# Patient Record
Sex: FEMALE | Employment: OTHER | URBAN - METROPOLITAN AREA
[De-identification: names, ages, dates, MRNs, and addresses within clinical notes are randomized per-mention and may not be internally consistent; named-entity substitution may affect disease eponyms.]

---

## 2019-07-11 ENCOUNTER — TRANSFERRED RECORDS (OUTPATIENT)
Dept: HEALTH INFORMATION MANAGEMENT | Facility: CLINIC | Age: 59
End: 2019-07-11

## 2019-07-11 ENCOUNTER — MEDICAL CORRESPONDENCE (OUTPATIENT)
Dept: HEALTH INFORMATION MANAGEMENT | Facility: CLINIC | Age: 59
End: 2019-07-11

## 2019-09-09 ENCOUNTER — OFFICE VISIT (OUTPATIENT)
Dept: OPHTHALMOLOGY | Facility: CLINIC | Age: 59
End: 2019-09-09
Attending: OPHTHALMOLOGY
Payer: COMMERCIAL

## 2019-09-09 DIAGNOSIS — H40.052 OCULAR HYPERTENSION, LEFT EYE: ICD-10-CM

## 2019-09-09 DIAGNOSIS — H15.103 EPISCLERITIS/SCLERITIS, BILATERAL: Primary | ICD-10-CM

## 2019-09-09 DIAGNOSIS — H04.123 DRY EYES, BILATERAL: ICD-10-CM

## 2019-09-09 DIAGNOSIS — R76.8 ELEVATED ANTINUCLEAR ANTIBODY (ANA) LEVEL: ICD-10-CM

## 2019-09-09 DIAGNOSIS — H15.003 EPISCLERITIS/SCLERITIS, BILATERAL: Primary | ICD-10-CM

## 2019-09-09 PROBLEM — D75.1 SECONDARY POLYCYTHEMIA: Status: ACTIVE | Noted: 2019-09-09

## 2019-09-09 PROBLEM — M85.80 OSTEOPENIA: Status: ACTIVE | Noted: 2019-09-09

## 2019-09-09 PROBLEM — Z86.0100 HISTORY OF COLONIC POLYPS: Status: ACTIVE | Noted: 2019-09-09

## 2019-09-09 PROBLEM — L21.9 SEBORRHEIC DERMATITIS OF SCALP: Status: ACTIVE | Noted: 2019-09-09

## 2019-09-09 PROBLEM — M71.20 SYNOVIAL CYST OF POPLITEAL SPACE: Status: ACTIVE | Noted: 2019-09-09

## 2019-09-09 PROCEDURE — G0463 HOSPITAL OUTPT CLINIC VISIT: HCPCS | Mod: ZF

## 2019-09-09 PROCEDURE — 92134 CPTRZ OPH DX IMG PST SGM RTA: CPT | Mod: ZF | Performed by: OPHTHALMOLOGY

## 2019-09-09 RX ORDER — BUPROPION HYDROCHLORIDE 150 MG/1
150 TABLET ORAL
COMMUNITY
Start: 2018-09-27 | End: 2019-10-14 | Stop reason: DRUGHIGH

## 2019-09-09 RX ORDER — LOTEPREDNOL ETABONATE 5 MG/G
GEL OPHTHALMIC
COMMUNITY
Start: 2019-03-07 | End: 2019-10-14

## 2019-09-09 RX ORDER — PREDNISONE 20 MG/1
TABLET ORAL
Refills: 0 | COMMUNITY
Start: 2019-08-19 | End: 2019-10-14

## 2019-09-09 RX ORDER — CELECOXIB 200 MG/1
CAPSULE ORAL
Refills: 0 | COMMUNITY
Start: 2019-03-26 | End: 2019-10-14 | Stop reason: DRUGHIGH

## 2019-09-09 RX ORDER — TRIAMCINOLONE ACETONIDE 1 MG/G
CREAM TOPICAL
COMMUNITY
Start: 2018-07-16

## 2019-09-09 RX ORDER — CELECOXIB 100 MG/1
CAPSULE ORAL
Refills: 11 | COMMUNITY
Start: 2019-07-20 | End: 2019-10-14 | Stop reason: DRUGHIGH

## 2019-09-09 RX ORDER — TRIAMTERENE AND HYDROCHLOROTHIAZIDE 75; 50 MG/1; MG/1
1 TABLET ORAL
COMMUNITY
Start: 2019-01-07

## 2019-09-09 RX ORDER — PREDNISOLONE ACETATE 10 MG/ML
SUSPENSION/ DROPS OPHTHALMIC
Refills: 2 | COMMUNITY
Start: 2019-07-23 | End: 2019-10-14

## 2019-09-09 RX ORDER — KETOROLAC TROMETHAMINE 5 MG/ML
1 SOLUTION OPHTHALMIC 3 TIMES DAILY
Qty: 5 ML | Refills: 3 | Status: SHIPPED | OUTPATIENT
Start: 2019-09-09 | End: 2019-10-14

## 2019-09-09 RX ORDER — CLOBETASOL PROPIONATE 0.5 MG/G
OINTMENT TOPICAL
COMMUNITY
Start: 2018-09-08

## 2019-09-09 RX ORDER — LUBIPROSTONE 24 UG/1
CAPSULE, GELATIN COATED ORAL
Refills: 0 | COMMUNITY
Start: 2019-04-11

## 2019-09-09 RX ORDER — BUPROPION HYDROCHLORIDE 150 MG/1
300 TABLET ORAL DAILY
Refills: 1 | COMMUNITY
Start: 2019-08-07

## 2019-09-09 RX ORDER — CELECOXIB 200 MG/1
1 CAPSULE ORAL
COMMUNITY
Start: 2019-01-07 | End: 2024-03-13

## 2019-09-09 RX ORDER — CLINDAMYCIN HCL 150 MG
CAPSULE ORAL
Refills: 2 | COMMUNITY
Start: 2019-06-07

## 2019-09-09 RX ORDER — METOPROLOL SUCCINATE 25 MG/1
25 TABLET, EXTENDED RELEASE ORAL
COMMUNITY
End: 2019-10-14 | Stop reason: DRUGHIGH

## 2019-09-09 RX ORDER — FLUTICASONE PROPIONATE 50 MCG
2 SPRAY, SUSPENSION (ML) NASAL
COMMUNITY
Start: 2019-02-09

## 2019-09-09 RX ORDER — ATORVASTATIN CALCIUM 20 MG/1
20 TABLET, FILM COATED ORAL DAILY
Refills: 3 | COMMUNITY
Start: 2019-08-13 | End: 2024-03-13

## 2019-09-09 ASSESSMENT — CONF VISUAL FIELD
METHOD: COUNTING FINGERS
OD_NORMAL: 1
OS_NORMAL: 1

## 2019-09-09 ASSESSMENT — VISUAL ACUITY
OD_SC+: -2
OD_SC: 20/25
METHOD: SNELLEN - LINEAR
OS_SC: 20/20

## 2019-09-09 ASSESSMENT — TONOMETRY
IOP_METHOD: TONOPEN
OS_IOP_MMHG: 22
OD_IOP_MMHG: 18

## 2019-09-09 ASSESSMENT — CUP TO DISC RATIO
OS_RATIO: 0.3
OD_RATIO: 0.2

## 2019-09-09 ASSESSMENT — EXTERNAL EXAM - LEFT EYE: OS_EXAM: NORMAL

## 2019-09-09 ASSESSMENT — EXTERNAL EXAM - RIGHT EYE: OD_EXAM: NORMAL

## 2019-09-09 NOTE — PROGRESS NOTES
CC: Scleritis/Episcleritis evaluation    History of Present Illness  The initial symptoms pf eye pain and redness started this May 2019. This was first identified by Ms. Rosales's Optometrist who identified redness and pain of both eyes, with the right eye more affected. This episcleritis/scleritis was treated with topical steroid and was then referred to Dr. Shepherd.    While the symptoms were somewhat improved with topical steroid, the Ophthalmologist, Dr. Shepherd who suggested that there may be some additional medication necessary. Recommend increasing Celebrex to 200 mg BID (had been using 100 mg daily since January 2019). This dose has been used for a few months and does not seem to have made a noticeable difference in eye or knee pain.    Past Ocular History  On prior visit with her Optometrist, Dr. Salvador, Ms. Rosales was referred to a Rheumatologist to screen for inflammatory causes of scleritis, did not identify specific causes of systemic inflammation. PATITO positive identified in chart.    Ocular history also significant for dry eyes, which is somewhat relieved by Topical Xiidra for years. Artificial tears also used, seemed to have helped.     Recently evaluated by Dr. Kayden Larson at [facility]. Tried oral prednisone which helped pain and redness for few days, but once this was tapered, the achy pain started up again mostly in the left eye. There is no significant visual issue when the eyes are red. Overall since May 2019, there has always been some redness and the aching even if things have improved to some degree.     Relevant Past Medical/Family/Social History  Prior ulcer secondary to Ibuprofen use. Works as . Smoking since June 2019 with family stressors.     Labs (June 2019) reviewed in Chart:   Abnormal: PATITO+ 1:160, CBC with slight elevated Hemoglobin 15.8 & Hematocrit 45.0  Normal/negative: ANCA, Anti CCP, Creatinine, ESR, CRP    Per patient, negative TB and Syphilis  testing per Dr. Larson, but not in chart (done at [facility], called as normal per patient)    Review of systems significant for knee pain. Prior Rheumatology consultation and upcoming Orthopedic evaluation soon     Current eye medications: Celebrex 100 mg BID (also used for Osteoarthritis, Xiiidra BID each eye, Systane QID each eye, Pred Acetate daily OU    OCT Spectralis Macula 9/9/19  right eye: Normal foveal contour, normal choroidal thickness. Focal area nasally of outer segment disruption in peripapillary area, no fluid.  left eye: Normal foveal contour, normal choroidal thickness. No fluid    Assessment:    1. Episcleritis/scleritis, bilateral  Notes detail either type of inflammation. Blanching with topical dilating drops suggestive of Episcleritis. Systemic work up unrevealing other than PATITO+. Responded to topical Pred Forte, Celebrex and Prednisone.    2. Dry eyes, bilateral  On Xiidra and Systane. Likely responsible for some of the redness, irritation/discomfort. Ocular surfaces fairly well lubricated. This appears to be related to age associated Meibomian Gland Dysfunction causing tear film evaporation and not inflammatory ocular surface disease.     3. Ocular hypertension, left eye  Mild elevation, no prior mention or IOP drop use, healthy optic nerves    4. Elevated antinuclear antibody (PATITO) level  Documented with Rheumatology, remainder of evaluation negative for specific causes of inflammation.    Recommenations:  - Continue Xiidra BID each eye and Systane QID each eye as to keep up with lubrication of the ocular surface  - Counseled on smoking cessation as will likely help reduce redness and irritation. Pt to make not of redness of eyes in association to timing of smoking.   - Recommend reducing Celebrex to 100 mg daily (one pill) as this increased dose may not be helping eyes or knees  - Stop daily Pred Acetate and switch to topical Ketorolac three times daily each eye.   - No additional  lab testing/body imaging at this time for other causes of uveitis    RTC 1 month (latest PM Visit), IOP, no dilation, no testing planned      Physician Attestation     Attending Physician Attestation:  Complete documentation of historical and exam elements from today's encounter can be found in the full encounter summary report (not reduplicated in this progress note). I personally obtained the chief complaint(s) and history of present illness. I confirmed and edited as necessary the review of systems, past medical/surgical history, family history, social history, and examination findings as documented by others; and I examined the patient myself. I personally reviewed the relevant tests, images, and reports as documented above. I formulated and edited as necessary the assessment and plan and discussed the findings and management plan with the patient and family.    Juan F Saleh M.D. September 9, 2019

## 2019-09-09 NOTE — PATIENT INSTRUCTIONS
Here is the plan for the medications:    - Continue Xiidra twice daily and Systane four times daily to each eye as to keep up with lubrication  - Work on smoking reduction as will likely help reduce redness and irritation.   - Reduce Celebrex to 100 mg daily (one pill) daily  - Stop daily Pred Acetate (steroid eye drop)  - Start topical Ketorolac three times daily in each eye. This was sent to [facility] in [facility] in [place]  - No additional lab testing/body imaging at this time but we will see you back in 1 month

## 2019-09-09 NOTE — NURSING NOTE
Chief Complaints and History of Present Illnesses   Patient presents with     Uveitis Evaluation     Chief Complaint(s) and History of Present Illness(es)     Uveitis Evaluation     Laterality: both eyes    Onset: gradual    Onset: months ago    Quality: fluctuating (States that the va fluctuates with dry eyes or pain in the LE)    Frequency: constantly    Associated symptoms: eye pain (LE more that can come and go), dryness and floaters (not new)    Treatments tried: eye drops    Pain scale: 0/10              Comments     2 weeks ago started a oral steroid, which helped the pain but is now back  Feels that right now she is in a flare up of the dry eyes and the Episcleritis  Ambika Andrade COT 8:59 AM September 9, 2019

## 2019-09-09 NOTE — LETTER
9/9/2019      RE: Alisa Rosales  [address] [place] [address]       CC: Scleritis/Episcleritis evaluation    History of Present Illness  The initial symptoms pf eye pain and redness started this May 2019. This was first identified by Ms. Rosales's Optometrist who identified redness and pain of both eyes, with the right eye more affected. This episcleritis/scleritis was treated with topical steroid and was then referred to Dr. Shepherd.    While the symptoms were somewhat improved with topical steroid, the Ophthalmologist, Dr. Shepherd who suggested that there may be some additional medication necessary. Recommend increasing Celebrex to 200 mg BID (had been using 100 mg daily since January 2019). This dose has been used for a few months and does not seem to have made a noticeable difference in eye or knee pain.    Past Ocular History  On prior visit with her Optometrist, Dr. Salvador, Ms. Rosales was referred to a Rheumatologist to screen for inflammatory causes of scleritis, did not identify specific causes of systemic inflammation. PATITO positive identified in chart.    Ocular history also significant for dry eyes, which is somewhat relieved by Topical Xiidra for years. Artificial tears also used, seemed to have helped.     Recently evaluated by Dr. Kayden Larson at [facility]. Tried oral prednisone which helped pain and redness for few days, but once this was tapered, the achy pain started up again mostly in the left eye. There is no significant visual issue when the eyes are red. Overall since May 2019, there has always been some redness and the aching even if things have improved to some degree.     Relevant Past Medical/Family/Social History  Prior ulcer secondary to Ibuprofen use. Works as . Smoking since June 2019 with family stressors.     Labs (June 2019) reviewed in Chart:   Abnormal: PATITO+ 1:160, CBC with slight elevated Hemoglobin 15.8 & Hematocrit 45.0  Normal/negative:  ANCA, Anti CCP, Creatinine, ESR, CRP    Per patient, negative TB and Syphilis testing per Dr. Larson, but not in chart (done at [facility], called as normal per patient)    Review of systems significant for knee pain. Prior Rheumatology consultation and upcoming Orthopedic evaluation soon     Current eye medications: Celebrex 100 mg BID (also used for Osteoarthritis, Xiiidra BID each eye, Systane QID each eye, Pred Acetate daily OU    OCT Spectralis Macula 9/9/19  right eye: Normal foveal contour, normal choroidal thickness. Focal area nasally of outer segment disruption in peripapillary area, no fluid.  left eye: Normal foveal contour, normal choroidal thickness. No fluid    Assessment:    1. Episcleritis/scleritis, bilateral  Notes detail either type of inflammation. Blanching with topical dilating drops suggestive of Episcleritis. Systemic work up unrevealing other than PATITO+. Responded to topical Pred Forte, Celebrex and Prednisone.    2. Dry eyes, bilateral  On Xiidra and Systane. Likely responsible for some of the redness, irritation/discomfort. Ocular surfaces fairly well lubricated. This appears to be related to age associated Meibomian Gland Dysfunction causing tear film evaporation and not inflammatory ocular surface disease.     3. Ocular hypertension, left eye  Mild elevation, no prior mention or IOP drop use, healthy optic nerves    4. Elevated antinuclear antibody (PATITO) level  Documented with Rheumatology, remainder of evaluation negative for specific causes of inflammation.    Recommenations:  - Continue Xiidra BID each eye and Systane QID each eye as to keep up with lubrication of the ocular surface  - Counseled on smoking cessation as will likely help reduce redness and irritation. Pt to make not of redness of eyes in association to timing of smoking.   - Recommend reducing Celebrex to 100 mg daily (one pill) as this increased dose may not be helping eyes or knees  - Stop daily Pred Acetate  and switch to topical Ketorolac three times daily each eye.   - No additional lab testing/body imaging at this time for other causes of uveitis    RTC 1 month (latest PM Visit), IOP, no dilation, no testing planned    Physician Attestation     I, Juan F Saleh MD, reviewed the chief complaint, history of present illness, past ocular history, and relevant allergies, medications and past medical/surgical/family history as well as an appropriate review of systems. I have performed and independent history, physical examination and evaluated this patient personally. I agree with the assessment and plan as documented above.        Juan F Saleh MD

## 2019-09-09 NOTE — LETTER
9/9/2019       RE: Alisa Rosales  [address] [address]     Dear Colleagues:    Thank you for referring your patient, Alisa Rosales, to the EYE CLINIC at [facility]. Please see a copy of my visit note below.    HPI/CC: Scleritis/Episcleritis evaluation    PMH:  The initial symptoms pf eye pain and redness started this May 2019. This was first identified by Ms. Rosales's Optometrist who identified redness and pain of both eyes, with the right eye more affected. This episcleritis/scleritis was treated with topical steroid and was then referred to Dr. Shepherd.    While the symptoms were somewhat improved with topical steroid, the Ophthalmologist, Dr. Shepherd who suggested that there may be some additional medication necessary. Recommend increasing Celebrex to 200 mg BID (had been using 100 mg daily since January 2019).     Prior visit with Rheumatologist, referred by her Optometrist, Dr. Salvador, to screen for inflammatory causes of scleritis, did not identify specific causes of systemic inflammation.     Recently evaluated by Dr. Kayden Larson at [facility]. Tried oral prednisone which helped. These were used for a few days which helped with pain and redness, but once this was tapered, the deep pain started up again. There is no significant visual issue when the eyes are red. Overall since May 2019, there has always been some redness and the aching.    Ocular history also significant for dry eyes, which is somewhat relieved by Topical Xiidra for years. Artificial tears also used, seemed to have helped.     There is a history of osteo-arthritis for Celebrex, currently taking 100 mg twice daily. Will see Orthopedic Surgeon this week about knee replacement for other knee. Also hip and TMJ issues, known previously.    Prior ulcer secondary to Ibuprofen use. Works as . Smoking since June 2019 with family stressors.       Labs (June 2019) reviewed  in Chart: PATITO+ 1:160, CBC normal WBC slight elevated Hemoglobin 15.8 and Hematocrit 45.0  Normal/negative: ANCA, Anti CCP, Creatinine, ESR, CRP    Per patient, negative TB and Syphilis testing per Dr. Larson, but not in chart (done at [facility], called as normal per patient)     Current eye medications: Celebrex 100 mg BID (also used for Osteoarthritis, Xiiidra BID each eye, Systane QID each eye, Pred Acetate daily OU    OCT Spectralis Macula 9/9/19  right eye: Normal foveal contour, normal choroidal thickness. Focal area nasally of outer segment disruption in peripapillary area, no fluid.  left eye: Normal foveal contour, normal choroidal thickness. No fluid    Assessment:    1. Episcleritis/scleritis, bilateral  Notes detail either type of inflammation. Blanching with topical dilating drops suggestive of Episcleritis. Systemic work up unrevealing other than PATITO+. Responded to topical Pred Forte, Celebrex and Prednisone.    2. Dry eyes, bilateral  On Xiidra and Systane. Likely responsible for some of the redness/irritation/discomfort.    3. Ocular hypertension, left eye  Mild elevated    4. Elevated antinuclear antibody (PATITO) level  Documented with Rheumatology, remainder of evaluation negative for specific causes of inflammation    Plan:  - Continue Xiidra BID each eye and Systane QID each eye as to keep up with lubrication  - Counseled on smoking cessation as will likely help reduce redness and irritation.   - Recommend reducing Celebrex to 100 mg daily (one pill) as this increased dose may not be helping eyes or knees  - Stop daily Pred Acetate and switch to topical Ketorolac three times daily in each eye.   - No additional lab testing/body imaging at this time    RTC 1 month (latest PM Visit), IOP, no dilation, no testing planned    Physician Attestation     I, Juan F Saleh MD, reviewed the chief complaint, history of present illness, past ocular history, and relevant allergies, medications and  past medical/surgical/family history as well as an appropriate review of systems. I have performed and independent history, physical examination and evaluated this patient personally. I agree with the assessment and plan as documented above.  Juan F Saleh MD     Again, thank you for allowing me to participate in the care of your patient.      Sincerely,    Juan F Saleh MD  [facility] Dept of Ophthalmology  Uveitis and Medical Retina

## 2019-10-14 ENCOUNTER — OFFICE VISIT (OUTPATIENT)
Dept: OPHTHALMOLOGY | Facility: CLINIC | Age: 59
End: 2019-10-14
Attending: OPHTHALMOLOGY
Payer: COMMERCIAL

## 2019-10-14 DIAGNOSIS — H04.123 DRY EYES, BILATERAL: ICD-10-CM

## 2019-10-14 DIAGNOSIS — H15.103 EPISCLERITIS/SCLERITIS, BILATERAL: Primary | ICD-10-CM

## 2019-10-14 DIAGNOSIS — H15.003 EPISCLERITIS/SCLERITIS, BILATERAL: Primary | ICD-10-CM

## 2019-10-14 DIAGNOSIS — H40.052 OCULAR HYPERTENSION, LEFT EYE: ICD-10-CM

## 2019-10-14 PROCEDURE — G0463 HOSPITAL OUTPT CLINIC VISIT: HCPCS | Mod: ZF

## 2019-10-14 RX ORDER — KETOROLAC TROMETHAMINE 5 MG/ML
1 SOLUTION OPHTHALMIC 2 TIMES DAILY
Qty: 10 ML | Refills: 5 | Status: SHIPPED | OUTPATIENT
Start: 2019-10-14

## 2019-10-14 ASSESSMENT — VISUAL ACUITY
OD_SC: 20/25
OS_SC: 20/20
METHOD: SNELLEN - LINEAR
OS_SC+: -1

## 2019-10-14 ASSESSMENT — EXTERNAL EXAM - LEFT EYE: OS_EXAM: NORMAL

## 2019-10-14 ASSESSMENT — CONF VISUAL FIELD
OD_NORMAL: 1
OS_NORMAL: 1
METHOD: COUNTING FINGERS

## 2019-10-14 ASSESSMENT — TONOMETRY
OD_IOP_MMHG: 11
OS_IOP_MMHG: 15
IOP_METHOD: ICARE

## 2019-10-14 ASSESSMENT — EXTERNAL EXAM - RIGHT EYE: OD_EXAM: NORMAL

## 2019-10-14 NOTE — LETTER
10/14/2019       RE: Alisa Rosales  [address] [address]     Dear Colleague,    Thank you for your care of, Alisa Rosales, to the EYE CLINIC at [facility]. Please see a copy of my visit note below.    Chief Complaint/Presenting Concern:  Ms. Rosales is here to follow up on her episcleritis each eye     Interval Ocular History of Present Illness: Ms. Rosales was evaluated here a few weeks ago and diagnosed with episcleritis. She was asked to use Ketorolac TID each eye and has noticed the eyes are less red and painful. She has continued to use the Xiidra and artificial tears. She has noticed a ring around the Cornea of the right eye only as well as some mild discharge to the inner corner of both eyes.     Ms. Rosales also reduced her Celebrex to 200 mg daily and feels the joint pains are not any worse.     Interval Updates to Medical/Family/Social History: Working on smoking reduction with Counseling. Recently diagnosed with osteoporosis and will be getting an injection    Relevant Review of Systems Updates:  No new joint pains, fevers, rashes.      Current eye related medications:  Xiidra BID each eye, Ketorolac TID each eye, Lubricating drops.     Assessment:   1. Episcleritis/scleritis, bilateral  Doing well on topical Ketorolac three times daily as well as smoking reduction. Has not worsened on reduced dose of oral Celebrex. Today there is only minimal redness of the ocular surface in both eyes. There is no anterior chamber cell in either eye.    2. Dry eyes, bilateral  Early tear break up time and mild discharge occasionally, although none on exam today    3. Ocular hypertension, left eye  Possible relationship to topical steroid. Normal today on ketorolac and no IOP drops    Plan/Recommendations:      Current medications: Reduce Ketorolac to 2x/day in each eye as maintenance. This was refilled today. If the eyes get more red/sore, try to increase  this eye drop to 3x/day in the affected eye for three days, then back to 2x/day each eye     Continue Xiidra twice daily each eye and lubricating drops each eye     Continue current dose of Celebrex and working on smoking reduction    RTC Dr. Larson in 2 months (before the end of the year). If the eyes continue to do well, could consider reducing the Ketorolac to daily in each eye which can be safely used as maintenance for many months as needed.     Return here PRN (IOP, no dilation, no testing)    Physician Attestation     Attending Physician Attestation:  Complete documentation of historical and exam elements from today's encounter can be found in the full encounter summary report (not reduplicated in this progress note). I personally obtained the chief complaint(s) and history of present illness. I confirmed and edited as necessary the review of systems, past medical/surgical history, family history, social history, and examination findings as documented by others; and I examined the patient myself. I personally reviewed the relevant tests, images, and reports as documented above. I formulated and edited as necessary the assessment and plan and discussed the findings and management plan with the patient and family.  Juan F Saleh MD.      Sincerely,    Juan F Saleh MD  [facility] Dept of Ophthalmology  Uveitis and Medical Retina

## 2019-10-14 NOTE — PROGRESS NOTES
Chief Complaint/Presenting Concern:  Ms. Rosales is here to follow up on her episcleritis each eye     Interval Ocular History of Present Illness: Ms. Rosales was evaluated here a few weeks ago and diagnosed with episcleritis. She was asked to use Ketorolac TID each eye and has noticed the eyes are less red and painful. She has continued to use the Xiidra and artificial tears. She has noticed a ring around the Cornea of the right eye only as well as some mild discharge to the inner corner of both eyes.     Ms. Rosales also reduced her Celebrex to 200 mg daily and feels the joint pains are not any worse.     Interval Updates to Medical/Family/Social History: Working on smoking reduction with Counseling. Recently diagnosed with osteoporosis and will be getting an injection    Relevant Review of Systems Updates:  No new joint pains, fevers, rashes.      Current eye related medications:  Xiidra BID each eye, Ketorolac TID each eye, Lubricating drops.     Assessment:     1. Episcleritis/scleritis, bilateral  Doing well on topical Ketorolac three times daily as well as smoking reduction. Has not worsened on reduced dose of oral Celebrex. Today there is only minimal redness of the ocular surface in both eyes. There is no anterior chamber cell in either eye.    2. Dry eyes, bilateral  Early tear break up time and mild discharge occasionally, although none on exam today    3. Ocular hypertension, left eye  Possible relationship to topical steroid. Normal today on ketorolac and no IOP drops    Plan/Recommendations:      Current medications: Reduce Ketorolac to 2x/day in each eye as maintenance. This was refilled today. If the eyes get more red/sore, try to increase this eye drop to 3x/day in the affected eye for three days, then back to 2x/day each eye     Continue Xiidra twice daily each eye and lubricating drops each eye     Continue current dose of Celebrex and working on smoking reduction    RTC Dr. Larson in 2  months (before the end of the year). If the eyes continue to do well, could consider reducing the Ketorolac to daily in each eye which can be safely used as maintenance for many months as needed.     Return here PRN (IOP, no dilation, no testing)    Physician Attestation     Attending Physician Attestation:  Complete documentation of historical and exam elements from today's encounter can be found in the full encounter summary report (not reduplicated in this progress note). I personally obtained the chief complaint(s) and history of present illness. I confirmed and edited as necessary the review of systems, past medical/surgical history, family history, social history, and examination findings as documented by others; and I examined the patient myself. I personally reviewed the relevant tests, images, and reports as documented above. I formulated and edited as necessary the assessment and plan and discussed the findings and management plan with the patient and family.    Juan F Saleh M.D., Uveitis and Medical Retina, October 14, 2019

## 2019-10-14 NOTE — NURSING NOTE
Chief Complaints and History of Present Illnesses   Patient presents with     Follow Up     1 month follow up Episcleritis/scleritis, bilateral     Chief Complaint(s) and History of Present Illness(es)     Follow Up     Comments: 1 month follow up Episcleritis/scleritis, bilateral              Comments     Pt states vision is the same as last visit. No eye pain today.   No new floaters.    CHERYLE Ramirez  October 14, 2019 2:32 PM

## 2019-10-14 NOTE — PATIENT INSTRUCTIONS
Reduce Ketorolac to 2x/day in each eye as maintenance. This was refilled today and sent to [facility] in [facility] in [place]. If the eyes get more red/sore, try to increase this eye drop to 3x/day in the affected eye for three days, then back to 2x/day each eye     We would ask you to see Dr. Larson before the end of the year. We can see you back here anytime if things are not doing well.

## 2020-03-11 ENCOUNTER — HEALTH MAINTENANCE LETTER (OUTPATIENT)
Age: 60
End: 2020-03-11

## 2021-01-03 ENCOUNTER — HEALTH MAINTENANCE LETTER (OUTPATIENT)
Age: 61
End: 2021-01-03

## 2021-04-25 ENCOUNTER — HEALTH MAINTENANCE LETTER (OUTPATIENT)
Age: 61
End: 2021-04-25

## 2021-10-10 ENCOUNTER — HEALTH MAINTENANCE LETTER (OUTPATIENT)
Age: 61
End: 2021-10-10

## 2022-02-17 PROBLEM — H40.052 OCULAR HYPERTENSION, LEFT EYE: Status: ACTIVE | Noted: 2019-10-14

## 2022-04-07 ENCOUNTER — TRANSFERRED RECORDS (OUTPATIENT)
Dept: HEALTH INFORMATION MANAGEMENT | Facility: CLINIC | Age: 62
End: 2022-04-07
Payer: COMMERCIAL

## 2022-05-21 ENCOUNTER — HEALTH MAINTENANCE LETTER (OUTPATIENT)
Age: 62
End: 2022-05-21

## 2022-09-18 ENCOUNTER — HEALTH MAINTENANCE LETTER (OUTPATIENT)
Age: 62
End: 2022-09-18

## 2023-06-04 ENCOUNTER — HEALTH MAINTENANCE LETTER (OUTPATIENT)
Age: 63
End: 2023-06-04

## 2023-12-17 ENCOUNTER — HEALTH MAINTENANCE LETTER (OUTPATIENT)
Age: 63
End: 2023-12-17

## 2024-02-06 ENCOUNTER — PATIENT OUTREACH (OUTPATIENT)
Dept: ONCOLOGY | Facility: CLINIC | Age: 64
End: 2024-02-06
Payer: COMMERCIAL

## 2024-02-06 ENCOUNTER — TRANSCRIBE ORDERS (OUTPATIENT)
Dept: OTHER | Age: 64
End: 2024-02-06

## 2024-02-06 DIAGNOSIS — D05.12 DUCTAL CARCINOMA IN SITU (DCIS) OF LEFT BREAST: Primary | ICD-10-CM

## 2024-02-06 NOTE — PROGRESS NOTES
New Patient Oncology Nurse Navigator Note     Referring provider: Dwight Austin MD      Referring Clinic/Organization: [facility]      Referred to (specialty:) Radiation Oncology     [place]      Date Referral Received: February 6, 2024     Evaluation for:  Breast cancer  D05.12 (ICD-10-CM) - Ductal carcinoma in situ (DCIS) of left breast      Clinical History (per Nurse review of records provided):      Diagnosis: Ductal Carcinoma in Situ, left breast  - Size: 2.4cm, Grade: 3  - Necrosis: present  - ER: negative     Treatment  - Left lumpectomy 01/25/2024  - Anticipated re-excision of close margin 02/08/2024     Records Location: [place] [facility]      Records Needed: NA     Additional testing needed prior to consult: [facility]    Payor: MEDICARE / Plan: MEDICARE / Product Type: Medicare /     February 9, 2024    Called patient to introduced myself and role as nurse navigator with [facility] Hematology/Oncology department and to inform them that we have received the referral for a diagnosis of DCIS from Dr. Dwight Austin.     Patient did not answer the phone so a detailed message was left for them including NPS number. Requested callback to speak to a  to set the consult date/time/location. Explained to patient in the message that they will receive a call from our new patient scheduling team (NPS number below, hours are Monday - Friday 8am - 4:30 pm) in the next 1-2 business days to schedule the consultation. Encouraged them to call back with any questions.     Janna Michel, RN, BSN  [facility] Hematology/Oncology Nurse Navigator  [phone]

## 2024-02-07 ENCOUNTER — PATIENT OUTREACH (OUTPATIENT)
Dept: ONCOLOGY | Facility: CLINIC | Age: 64
End: 2024-02-07
Payer: COMMERCIAL

## 2024-02-07 NOTE — PROGRESS NOTES
New Patient Oncology Nurse Navigator Note     Referring provider: Dwight Austin MD      Referring Clinic/Organization: [facility]      Referred to (specialty:) Radiation Oncology     Requested provider (if applicable): [facility] [place] location     Date Referral Received: February 7, 2024     Evaluation for:  Breast cancer  D05.12 (ICD-10-CM) - Ductal carcinoma in situ (DCIS) of left breast      Clinical History (per Nurse review of records provided):      Diagnosis: Ductal Carcinoma in Situ, left breast  - Size: 2.4cm, Grade: 3  - Necrosis: present  - ER: negative    Treatment  - Left lumpectomy 01/25/2024  - Anticipated re-excision of close margin 02/08/2024    HPI:  She had an abnormal screening mammogram on 12/20/2023. She underwent a diagnostic mammogram and biopsy on 01/02/2024, this was positive for DCIS. She underwent a left lumpectomy on 01/25/2024. This was notable for a close margin and she is going back for a re-excision on 02/08/2024. She has otherwise recovered well.     Per Medical Oncologist visit 2/6/24:   I reviewed in detail the nature and biology of her disease. She has DCIS of the left breast. The characteristics were grade 3, necrosis was present, and the margins were close but negative, she is having a re-excision 02/08. We discussed the treatment of DCIS which includes surgery, which she has had, followed by consideration of radiation and hormone therapy, the latter not likely helpful given her ER negative disease. Treatment decreases the risk of ipsilateral and contralateral recurrence of DCIS or an invasive breast cancer. We did review that this does not impact overall survival in patients. Using the [facility] DCIS nomogram I reviewed with her that the risk of recurrence over the next 10 years is 14% after her re-excision. With radiation this decreases to 5% at 10 years. I have placed a referral for radiation. She would prefer this in [place] for her work. I reviewed  the role of high-risk screening with a breast MRI in addition to mammography. She has an inspire device that has had radiology hesitant to consider an MRI. I will plan to see her in about 3 months, after radiation, to finalize a plan. I anticipate she'll follow with her PCP for mammography.      Records Location: Care Everywhere and See Bookmarked material     Records Needed:     Pathology from re-excision on 2/8/24.      Additional testing needed prior to consult: NA    Payor: MEDICARE / Plan: MEDICARE / Product Type: Medicare /     February 7, 2024  Referral received and reviewed. Sent to NPS to schedule.     Janna VELARDE, RN   Oncology Nurse Navigator   [facility]   [phone] / [phone]

## 2024-02-22 NOTE — TELEPHONE ENCOUNTER
MEDICAL RECORDS REQUEST   Radiation Oncology  [address] [place] [address]  PHONE: [phone]  Fax: [phone]        FUTURE VISIT INFORMATION                                                   Alisa Rosales, : 1960 scheduled for future visit at [facility] Radiation Oncology    APPOINTMENT INFORMATION:  Date: 3/13/2024  Provider:  Dr. Ana Maria   Reason for Visit/Diagnosis:  Breast Cancer     REFERRAL INFORMATION:  Referring provider:      RECORDS REQUESTED FOR VISIT                                                     Action    Action Taken 2024 4:11pm KEB   I faxed a request for imaging to [facility]     I called pt Alisa - unavailable. I tried calling again- it went to . I called a third time. Her  Alban answered.     Alisa will try calling back next week Mon-Wed. I provided her  with a good call back # [phone]. We just need to see if Alisa has an hx of rad onc treatments.   2024  11:48 AM THOM  CB from Pt stating no previous radiation  Confirmed images received and resolved to PACS.     SOFT TISSUE & BREAST     CT, MRI, PET/CT AND REPORTS in process-Requested scans from [facility]    ANY RECENT LABS yes   SURGICAL REPORTS yes-  (External Biopsy)   A.  Breast, left, partial mastectomy:  Ductal carcinoma   PATHOLOGY REPORTS yes   CHEMO & MEDICAL ONCOLOGY NOTES yes   CURRENT MEDICATION LIST yes   PREVIOUS RADIATION  DATE REQUESTED DATE RECEIVED   WHAT HEALTHCARE FACILITY    INITIAL HEALTH PROGRESS INTAKE     RADIATION ONCOLOGIST NOTES     RADIATION TREATMENT SUMMARY NOTES     RAD-TREATMENT PLAN     DVH = DOSE VOLUME HISTOGRAM     DICOM CD (TX PLANNING CT, TX PLAN, STRUCTURE SET)     *If no DICOM avail ask for DRRs          *Send all radiation Prior radiation records for both [facility] and [facility] Radiation Oncology patients to [facility] with  ATTN: DEZ/Ary Dosi/Physics

## 2024-02-25 ENCOUNTER — HEALTH MAINTENANCE LETTER (OUTPATIENT)
Age: 64
End: 2024-02-25

## 2024-03-13 ENCOUNTER — PRE VISIT (OUTPATIENT)
Dept: RADIATION ONCOLOGY | Facility: CLINIC | Age: 64
End: 2024-03-13
Payer: COMMERCIAL

## 2024-03-13 ENCOUNTER — OFFICE VISIT (OUTPATIENT)
Dept: RADIATION ONCOLOGY | Facility: CLINIC | Age: 64
End: 2024-03-13
Attending: INTERNAL MEDICINE
Payer: MEDICARE

## 2024-03-13 VITALS
OXYGEN SATURATION: 100 % | DIASTOLIC BLOOD PRESSURE: 74 MMHG | WEIGHT: 177.9 LBS | SYSTOLIC BLOOD PRESSURE: 137 MMHG | TEMPERATURE: 97.5 F | RESPIRATION RATE: 16 BRPM | HEART RATE: 84 BPM

## 2024-03-13 DIAGNOSIS — D05.12 DUCTAL CARCINOMA IN SITU (DCIS) OF LEFT BREAST: Primary | ICD-10-CM

## 2024-03-13 PROCEDURE — 77263 THER RADIOLOGY TX PLNG CPLX: CPT | Performed by: RADIOLOGY

## 2024-03-13 PROCEDURE — 99205 OFFICE O/P NEW HI 60 MIN: CPT | Mod: 25 | Performed by: RADIOLOGY

## 2024-03-13 PROCEDURE — G0463 HOSPITAL OUTPT CLINIC VISIT: HCPCS | Performed by: RADIOLOGY

## 2024-03-13 RX ORDER — PHENTERMINE HYDROCHLORIDE 37.5 MG/1
37.5 CAPSULE ORAL DAILY
COMMUNITY

## 2024-03-13 RX ORDER — ATORVASTATIN CALCIUM 40 MG/1
1 TABLET, FILM COATED ORAL
COMMUNITY
Start: 2024-02-14

## 2024-03-13 RX ORDER — CELECOXIB 100 MG/1
CAPSULE ORAL
COMMUNITY
Start: 2024-03-09

## 2024-03-13 RX ORDER — METHYLPHENIDATE HYDROCHLORIDE EXTENDED RELEASE 20 MG/1
20 TABLET ORAL
COMMUNITY
Start: 2022-08-01

## 2024-03-13 RX ORDER — DIPHENOXYLATE HYDROCHLORIDE AND ATROPINE SULFATE 2.5; .025 MG/1; MG/1
1 TABLET ORAL DAILY
COMMUNITY

## 2024-03-13 RX ORDER — ACETAMINOPHEN 500 MG
TABLET ORAL
COMMUNITY
Start: 2023-06-16

## 2024-03-13 RX ORDER — ALPRAZOLAM 1 MG/1
1 TABLET, EXTENDED RELEASE ORAL DAILY
COMMUNITY

## 2024-03-13 RX ORDER — MINOXIDIL 2.5 MG/1
2.5 TABLET ORAL
COMMUNITY
Start: 2023-09-13

## 2024-03-13 RX ORDER — TRAZODONE HYDROCHLORIDE 50 MG/1
TABLET, FILM COATED ORAL
COMMUNITY
Start: 2022-10-12

## 2024-03-13 RX ORDER — ONDANSETRON 4 MG/1
4 TABLET, ORALLY DISINTEGRATING ORAL
COMMUNITY
Start: 2022-11-21

## 2024-03-13 RX ORDER — ORAL SEMAGLUTIDE 14 MG/1
14 TABLET ORAL
COMMUNITY
Start: 2023-09-27

## 2024-03-13 RX ORDER — RIMEGEPANT SULFATE 75 MG/75MG
75 TABLET, ORALLY DISINTEGRATING ORAL
COMMUNITY
Start: 2022-11-21

## 2024-03-13 RX ORDER — FLUOROMETHOLONE 0.1 %
SUSPENSION, DROPS(FINAL DOSAGE FORM)(ML) OPHTHALMIC (EYE)
COMMUNITY

## 2024-03-13 RX ORDER — ESTRADIOL 0.1 MG/G
1 CREAM VAGINAL
COMMUNITY
Start: 2023-02-09

## 2024-03-13 RX ORDER — AZELASTINE 1 MG/ML
SPRAY, METERED NASAL
COMMUNITY
Start: 2023-11-03

## 2024-03-13 RX ORDER — TOPIRAMATE 100 MG/1
100 TABLET, FILM COATED ORAL
COMMUNITY
Start: 2022-11-21

## 2024-03-13 RX ORDER — PREDNISOLONE ACETATE 10 MG/ML
SUSPENSION/ DROPS OPHTHALMIC
COMMUNITY
Start: 2023-09-22

## 2024-03-13 RX ORDER — LORAZEPAM 0.5 MG/1
TABLET ORAL
COMMUNITY
Start: 2023-09-24

## 2024-03-13 RX ORDER — BUDESONIDE AND FORMOTEROL FUMARATE DIHYDRATE 160; 4.5 UG/1; UG/1
2 AEROSOL RESPIRATORY (INHALATION) 2 TIMES DAILY
COMMUNITY
Start: 2023-07-23

## 2024-03-13 RX ORDER — GABAPENTIN 100 MG/1
CAPSULE ORAL
COMMUNITY
Start: 2023-06-16

## 2024-03-13 ASSESSMENT — PAIN SCALES - GENERAL: PAINLEVEL: NO PAIN (0)

## 2024-03-13 NOTE — PROGRESS NOTES
[facility] Radiation Oncology Consult Note     Patient: Alisa Rosales  MRN: [id number]  Date of Service: 03/13/2024          Dwight Austin MD  [address] [place] [address]       Dear Dr. Austin:    Thank you very much for referring this patient for consideration of radiotherapy. As you know Ms. Rosales is a 63 year old female with a diagnosis of left breast DCIS, ER negative, status post lumpectomy with close margin and reexcision showed no evidence of residual disease.  The patient is referred to radiation oncology for evaluation and consideration of possible postop radiation therapy.    HISTORY OF PRESENT ILLNESS:   Ms. Rosales is a 63 year old female who has been encouraged usual state of health until recently.  The patient presented with abnormal finding by routine screening mammogram for which she was seeking further evaluation.  The mammogram showed area of indeterminate calcification in the left breast at the 12 o'clock position at middle depth.  Patient underwent stereotactic needle biopsy on 1/2/2024 with pathology showed ductal carcinoma in situ, high nuclear grade, cribriform type with necrosis.  ER receptors was negative.  Patient then proceeded with lumpectomy on 1/5/2024.  The pathology showed a 2.4 cm DCIS with negative resection margin.  DCIS however is less than 1 mm from closest posterior margin and 0.2 cm from anterior margin.  Patient underwent reexcision on 2/8/2024 with pathology showed no evidence of residual disease.  You saw patient on 2/6/2024 and adjuvant radiation therapy was recommended.  Due to the closeness of her home to our facility, the patient is kindly referred to our clinic for consideration of radiation.    CHEMOTHERAPY HISTORY: Concurrent Chemotherapy: No    RADIATION THERAPY HISTORY: Prior Radiation: No    IMPLANTED CARDIAC DEVICE: none     PREGNANCY: The patient is informed not to be pregnant during the radiation therapy.  She is post  menopausal.    Current Outpatient Medications   Medication Sig Dispense Refill    acetaminophen (TYLENOL) 500 MG tablet 1-2 tablets by mouth TID. Max acetaminophen dose: 4000mg in 24 hrs.      ALPRAZolam (XANAX XR) 1 MG 24 hr tablet Take 1 tablet by mouth daily      AMITIZA 24 MCG capsule TAKE 1 CAPSULE BY MOUTH EVERY DAY AS NEEDED  0    atorvastatin (LIPITOR) 40 MG tablet Take 1 tablet by mouth daily at 2 pm      azelastine (ASTELIN) 0.1 % nasal spray 4 sprays into nasal rinse twice daily. May use in conjunction with nasal steroid medications. OK TO FILL 90 DAY SUPPLY      budesonide-formoterol (SYMBICORT) 160-4.5 MCG/ACT Inhaler Inhale 2 puffs into the lungs 2 times daily      buPROPion (WELLBUTRIN XL) 150 MG 24 hr tablet Take 300 mg by mouth daily  1    celecoxib (CELEBREX) 100 MG capsule TAKE 1 CAPSULE BY MOUTH TWICE DAILY AS NEEDED FOR PAIN      clindamycin (CLEOCIN) 150 MG capsule TAKE 2 CAPSULES 1 HOUR PRIOR TO DENTAL APPOINTMENT, TAKE 1 CAPSULE 6 HOURS AFTER APPOINTMENT  2    clobetasol (TEMOVATE) 0.05 % external ointment       estradiol (ESTRACE) 0.1 MG/GM vaginal cream Place 1 g vaginally      fluorometholone (FML LIQUIFILM) 0.1 % ophthalmic suspension SHAKE LIQUID AND INSTILL 1 DROP IN BOTH EYES EVERY DAY      fluticasone (FLONASE) 50 MCG/ACT nasal spray 2 sprays      gabapentin (NEURONTIN) 100 MG capsule Take 1 capsule on arising about 10 am, 2 capsules about 7 pm, and 1 capsule at midnight. May increase to 100 - 200 if needed. For moving toes.      ketorolac (ACULAR) 0.5 % ophthalmic solution Place 1 drop into both eyes 2 times daily 10 mL 5    lifitegrast (XIIDRA) 5 % opthalmic solution Inject 1 drop into the eye      linaclotide (LINZESS) 290 MCG capsule Take 290 mcg by mouth      LORazepam (ATIVAN) 0.5 MG tablet TAKE 1 TABLET BY MOUTH EVERY 4 HOURS AS NEEDED FOR ANXIETY      methylphenidate (METADATE ER) 20 MG CR tablet Take 20 mg by mouth      METOPROLOL SUCCINATE ER PO Take 25 mg by mouth       minoxidil (LONITEN) 2.5 MG tablet Take 2.5 mg by mouth      Multiple Vitamin (MULTI-VITAMINS) TABS Take 1 tablet by mouth daily      NURTEC 75 MG ODT tablet Take 75 mg by mouth      OMEGA-3 FATTY ACIDS PO       ondansetron (ZOFRAN ODT) 4 MG ODT tab Take 4 mg by mouth      phentermine (ADIPEX-P) 37.5 MG capsule Take 37.5 mg by mouth daily      prednisoLONE acetate (PRED FORTE) 1 % ophthalmic suspension INSTILL 1 DROP IN BOTH EYES THREE TIMES DAILY FOR 7 DAYS THEN TWICE DAILY FOR 7 DAYS THEN DAILY FOR 7 DAYS THEN STOP      RYBELSUS 14 MG tablet Take 14 mg by mouth      sertraline (ZOLOFT) 50 MG tablet Take 50 mg by mouth      tiZANidine (ZANAFLEX) 4 MG tablet Take 4 mg by mouth      topiramate (TOPAMAX) 100 MG tablet Take 100 mg by mouth      traZODone (DESYREL) 50 MG tablet Take by mouth.      triamcinolone (KENALOG) 0.1 % external cream       triamterene-HCTZ (MAXZIDE) 75-50 MG tablet Take 1 tablet by mouth       Past Medical History:   Diagnosis Date    Episcleritis     MG (obstructive sleep apnea)     Osteopenia     Scleritis      Past Surgical History:   Procedure Laterality Date    CARPAL TUNNEL RELEASE RT/LT      KNEE SURGERY      SINUS SURGERY       Allergies   Allergen Reactions    Levofloxacin GI Disturbance and Nausea    Aspirin GI Disturbance, Other (See Comments) and Unknown     Hx of ulcer    Hydrocodone Other (See Comments)    Metoclopramide Other (See Comments)     Other reaction(s): no help  No help      Paroxetine Other (See Comments)     Other reaction(s): did not tolerate  Did not tolerate      Prednisone      Other reaction(s): Agitation, Hypertension, Insomnia  Other reaction(s): Agitation, Insomnia      Penicillins Rash     Other reaction(s): Rash     Family History   Problem Relation Age of Onset    Glaucoma Mother     Macular Degeneration Mother     Diabetes Father      Social History     Socioeconomic History    Marital status: Single     Spouse name: Not on file    Number of children: Not  on file    Years of education: Not on file    Highest education level: Not on file   Occupational History    Not on file   Tobacco Use    Smoking status: Every Day     Types: Cigarettes     Start date: 6/1/2019    Smokeless tobacco: Never   Substance and Sexual Activity    Alcohol use: Not on file    Drug use: Not on file    Sexual activity: Not on file   Other Topics Concern    Not on file   Social History Narrative    Not on file     Social Determinants of Health     Financial Resource Strain: Not on file   Food Insecurity: Not on file   Transportation Needs: Not on file   Physical Activity: Not on file   Stress: Not on file   Social Connections: Not on file   Interpersonal Safety: Not on file   Housing Stability: Not on file        REVIEW OF SYMPTOMS:  A full 14-point review of systems was performed. Pertinent findings are noted in the HPI.    General  Constitutional  Constitutional (WDL): All constitutional elements are within defined limits  EENT  Eye Disorders  Eye Disorder (WDL): Exceptions to WDL (wears glasses)  Dry Eye: Asymptomatic OR clinical or diagnostic observations only OR symptoms relieved by lubricants  Ear Disorders  Ear Disorder (WDL): All ear disorder elements are within defined limits  Respiratory  Respiratory  Respiratory (WDL): All respiratory elements are within defined limits  Cardiovascular  Cardiovascular  Cardiovascular (WDL): Exceptions to WDL (no pacemaker)  Edema: Yes  Edema Limbs: 5 - 10% inter-limb discrepancy in volume or circumference at point of greatest visible difference OR swelling or obscuration of anatomic architecture on close inspection (left 2nd toe)  Gastrointestinal  Gastrointestinal  Gastrointestinal (WDL): All gastrointestinal elements are within defined limits  Musculoskeletal  Musculoskeletal and Connective Tissue Disorders  Musculoskeletal & Connective (WDL): Exceptions to WDL  Arthralgia: Mild pain (low back, hip  arthritis)  Integumentary  Integumentary  Integumentary (WDL): Exceptions to WDL (healing left breast incision)  Neurological  Neurosensory  Neurosensory (WDL): All neurosensory elements are within defined limits  Genitourinary/Reproductive  Genitourinary  Genitourinary (WDL): All genitourinary elements are within defined limits  Lymphatic  Lymph System Disorders  Lymph (WDL): All lymph elements are within defined limits  Pain  Pain Score: No Pain (0)  AUA Assessment                                                              Accompanied by  Accompanied By: self only    ECOG Status: 0    Imaging: Reviewed    Pathology: Reviewed    Objective:      PHYSICAL EXAMINATION:    /74 (BP Location: Right arm, Patient Position: Sitting, Cuff Size: Adult Regular)   Pulse 84   Temp 97.5  F (36.4  C) (Oral)   Resp 16   Wt 80.7 kg (177 lb 14.4 oz)   SpO2 100%     Gen: Alert, in NAD  Eyes: PERRL, EOMI, sclera anicteric  Neck: Supple, full ROM, no LAD  Chest: The breasts and nipples are symmetrical bilaterally.  There is no evidence of nipple discharge.  There is no palpable lump or mass bilaterally in the breast, axillary, and supraclavicular region.  There is well-healed surgical scar in the left breast consistent with a recent history of surgery.  Pulm: No wheezing, stridor or respiratory distress  CV: Well-perfused, no cyanosis, no pedal edema  Back: No step-offs or pain to palpation along the thoracolumbar spine  Rectal: Deferred  : Deferred  Musculoskeletal: Normal muscle bulk and tone  Skin: Normal color and turgor  Neurologic: A/Ox3, CN II-XII intact, normal gait and station  Psychiatric: Appropriate mood and affect    Intent of Therapy: Curative  We recommend adjuvant radiation as part of her breast conserving therapy to decrease her chance of local recurrence to the single digits. ROM stretches and skin care were discussed with the patient. She understands that these maneuvers need to continue for 6 months  following completion of radiation as skin and muscle fibrosis continue to form for weeks to months following completion of therapy.      Side effects that may occur during or within weeks after radiation therapy    Fatigue and general weakness  Darkening, irritation, itchiness, redness, dryness, erythema, peeling, scabbing, ulceration and contraction of the skin of the breast and chest  Swelling of the breast  Loss of armpit hair  Lung irritation  Decrease in appetite    Side effects that may occur months or years after radiation therapy    Development of another tumor or cancer  Thickening, telangiectasias (development of spider like blood vessels in the skin) and ulceration of the skin of the breast and chest  Firming, fibrosis (scar tissue), fat necrosis, and distortion of the breast  Poor healing after a trauma or surgery in the irradiated area  Nerve damage resulting in loss of arm strength and sensation  Coronary artery blockage causing angina pain or a heart attack  Lung inflammation of fibrosis causing cough, fever and shortness of breath  Fracture of the ribs  Swellingof an arm and hand    The risks, benefits and alternatives to radiation therapy were outlined with the patient. All questions were answered and a consent was signed.     Impression     Left breast DCIS, ER negative, status post lumpectomy with close margin and reexcision showed no evidence of residual disease.    Assessment & Plan:     I have personally reviewed her upcoming medical record today.  I have also reviewed her most recent radiology study including mammogram.  The possible treatment options including surgery, systemic therapy, and radiation therapy has been discussed with patient in detail and at great lengths.  The possible risks and side effects of radiation therapy has also been explained to the patient.  Questions are answered to patient satisfaction. I have informed the patient that postoperative radiation therapy will reduce  50-70% risks of developing invasive cancer in the future. There is no survival benefit after postoperative radiation therapy. The patient might have approximately 1% risks per year to develop invasive cancer.  It is reasonable to consider adjuvant radiation therapy for patient with palpable mass, large size, high-grade, close or involved margins and age less than 50 years.    The pros and cons of different treatment options have been discussed with the patient in detail and at the greatest length. Questions are answered to patient's satisfaction. The patient is in a good health status and anxious about having cancer diagnosis in the future. She therefore elected to proceed with post-operative radiation therapy as her treatment of choice for her left breast DCIS.  She is scheduled return to radiation oncology next week for simulation.  I plan to give her radiation therapy to a total dose of 4005 cGy in 15 treatments targeted to the left breast only.    Again, thank you very much for the referral and allowing me to participate in the care of this patient.  If you have any questions or concerns about this consultation, please do not hesitate to call.  I spent approximately 45 minutes today with the patient and 80% time was used for counseling.      Sincerely,          Ana Maria MD, PhD  Department of Radiation Oncology   [facility]  Tel: [phone]  Page: [phone]    [facility]  [address] [place] [address]     [address]    [place] [address]    CC:  Patient Care Team:  No Ref-Primary, Physician as PCP - Maxwell Rodriguez as Referring Physician  Dwight Austin MD as Ana Mueller MD as MD (Radiation Oncology)

## 2024-03-13 NOTE — LETTER
3/13/2024         RE: Alisa Rosales  [address] [facility] [id number]        Dear Colleague,    Thank you for referring your patient, Alisa Rosales, to the [facility] RADIATION ONCOLOGY [place]. Please see a copy of my visit note below.    [facility] Radiation Oncology Consult Note     Patient: Alisa Rosales  MRN: [id number]  Date of Service: 03/13/2024          Dwight Austin MD  [address] [place] [address]       Dear Dr. Austin:    Thank you very much for referring this patient for consideration of radiotherapy. As you know Ms. Rosales is a 63 year old female with a diagnosis of left breast DCIS, ER negative, status post lumpectomy with close margin and reexcision showed no evidence of residual disease.  The patient is referred to radiation oncology for evaluation and consideration of possible postop radiation therapy.    HISTORY OF PRESENT ILLNESS:   Ms. Rosales is a 63 year old female who has been encouraged usual state of health until recently.  The patient presented with abnormal finding by routine screening mammogram for which she was seeking further evaluation.  The mammogram showed area of indeterminate calcification in the left breast at the 12 o'clock position at middle depth.  Patient underwent stereotactic needle biopsy on 1/2/2024 with pathology showed ductal carcinoma in situ, high nuclear grade, cribriform type with necrosis.  ER receptors was negative.  Patient then proceeded with lumpectomy on 1/5/2024.  The pathology showed a 2.4 cm DCIS with negative resection margin.  DCIS however is less than 1 mm from closest posterior margin and 0.2 cm from anterior margin.  Patient underwent reexcision on 2/8/2024 with pathology showed no evidence of residual disease.  You saw patient on 2/6/2024 and adjuvant radiation therapy was recommended.  Due to the closeness of her home to our facility, the patient is kindly referred to our clinic for consideration of  radiation.    CHEMOTHERAPY HISTORY: Concurrent Chemotherapy: No    RADIATION THERAPY HISTORY: Prior Radiation: No    IMPLANTED CARDIAC DEVICE: none     PREGNANCY: The patient is informed not to be pregnant during the radiation therapy.  She is post menopausal.    Current Outpatient Medications   Medication Sig Dispense Refill     acetaminophen (TYLENOL) 500 MG tablet 1-2 tablets by mouth TID. Max acetaminophen dose: 4000mg in 24 hrs.       ALPRAZolam (XANAX XR) 1 MG 24 hr tablet Take 1 tablet by mouth daily       AMITIZA 24 MCG capsule TAKE 1 CAPSULE BY MOUTH EVERY DAY AS NEEDED  0     atorvastatin (LIPITOR) 40 MG tablet Take 1 tablet by mouth daily at 2 pm       azelastine (ASTELIN) 0.1 % nasal spray 4 sprays into nasal rinse twice daily. May use in conjunction with nasal steroid medications. OK TO FILL 90 DAY SUPPLY       budesonide-formoterol (SYMBICORT) 160-4.5 MCG/ACT Inhaler Inhale 2 puffs into the lungs 2 times daily       buPROPion (WELLBUTRIN XL) 150 MG 24 hr tablet Take 300 mg by mouth daily  1     celecoxib (CELEBREX) 100 MG capsule TAKE 1 CAPSULE BY MOUTH TWICE DAILY AS NEEDED FOR PAIN       clindamycin (CLEOCIN) 150 MG capsule TAKE 2 CAPSULES 1 HOUR PRIOR TO DENTAL APPOINTMENT, TAKE 1 CAPSULE 6 HOURS AFTER APPOINTMENT  2     clobetasol (TEMOVATE) 0.05 % external ointment        estradiol (ESTRACE) 0.1 MG/GM vaginal cream Place 1 g vaginally       fluorometholone (FML LIQUIFILM) 0.1 % ophthalmic suspension SHAKE LIQUID AND INSTILL 1 DROP IN BOTH EYES EVERY DAY       fluticasone (FLONASE) 50 MCG/ACT nasal spray 2 sprays       gabapentin (NEURONTIN) 100 MG capsule Take 1 capsule on arising about 10 am, 2 capsules about 7 pm, and 1 capsule at midnight. May increase to 100 - 200 if needed. For moving toes.       ketorolac (ACULAR) 0.5 % ophthalmic solution Place 1 drop into both eyes 2 times daily 10 mL 5     lifitegrast (XIIDRA) 5 % opthalmic solution Inject 1 drop into the eye       linaclotide (LINZESS)  290 MCG capsule Take 290 mcg by mouth       LORazepam (ATIVAN) 0.5 MG tablet TAKE 1 TABLET BY MOUTH EVERY 4 HOURS AS NEEDED FOR ANXIETY       methylphenidate (METADATE ER) 20 MG CR tablet Take 20 mg by mouth       METOPROLOL SUCCINATE ER PO Take 25 mg by mouth       minoxidil (LONITEN) 2.5 MG tablet Take 2.5 mg by mouth       Multiple Vitamin (MULTI-VITAMINS) TABS Take 1 tablet by mouth daily       NURTEC 75 MG ODT tablet Take 75 mg by mouth       OMEGA-3 FATTY ACIDS PO        ondansetron (ZOFRAN ODT) 4 MG ODT tab Take 4 mg by mouth       phentermine (ADIPEX-P) 37.5 MG capsule Take 37.5 mg by mouth daily       prednisoLONE acetate (PRED FORTE) 1 % ophthalmic suspension INSTILL 1 DROP IN BOTH EYES THREE TIMES DAILY FOR 7 DAYS THEN TWICE DAILY FOR 7 DAYS THEN DAILY FOR 7 DAYS THEN STOP       RYBELSUS 14 MG tablet Take 14 mg by mouth       sertraline (ZOLOFT) 50 MG tablet Take 50 mg by mouth       tiZANidine (ZANAFLEX) 4 MG tablet Take 4 mg by mouth       topiramate (TOPAMAX) 100 MG tablet Take 100 mg by mouth       traZODone (DESYREL) 50 MG tablet Take by mouth.       triamcinolone (KENALOG) 0.1 % external cream        triamterene-HCTZ (MAXZIDE) 75-50 MG tablet Take 1 tablet by mouth       Past Medical History:   Diagnosis Date     Episcleritis      MG (obstructive sleep apnea)      Osteopenia      Scleritis      Past Surgical History:   Procedure Laterality Date     CARPAL TUNNEL RELEASE RT/LT       KNEE SURGERY       SINUS SURGERY       Allergies   Allergen Reactions     Levofloxacin GI Disturbance and Nausea     Aspirin GI Disturbance, Other (See Comments) and Unknown     Hx of ulcer     Hydrocodone Other (See Comments)     Metoclopramide Other (See Comments)     Other reaction(s): no help  No help       Paroxetine Other (See Comments)     Other reaction(s): did not tolerate  Did not tolerate       Prednisone      Other reaction(s): Agitation, Hypertension, Insomnia  Other reaction(s): Agitation, Insomnia        Penicillins Rash     Other reaction(s): Rash     Family History   Problem Relation Age of Onset     Glaucoma Mother      Macular Degeneration Mother      Diabetes Father      Social History     Socioeconomic History     Marital status: Single     Spouse name: Not on file     Number of children: Not on file     Years of education: Not on file     Highest education level: Not on file   Occupational History     Not on file   Tobacco Use     Smoking status: Every Day     Types: Cigarettes     Start date: 6/1/2019     Smokeless tobacco: Never   Substance and Sexual Activity     Alcohol use: Not on file     Drug use: Not on file     Sexual activity: Not on file   Other Topics Concern     Not on file   Social History Narrative     Not on file     Social Determinants of Health     Financial Resource Strain: Not on file   Food Insecurity: Not on file   Transportation Needs: Not on file   Physical Activity: Not on file   Stress: Not on file   Social Connections: Not on file   Interpersonal Safety: Not on file   Housing Stability: Not on file        REVIEW OF SYMPTOMS:  A full 14-point review of systems was performed. Pertinent findings are noted in the HPI.    General  Constitutional  Constitutional (WDL): All constitutional elements are within defined limits  EENT  Eye Disorders  Eye Disorder (WDL): Exceptions to WDL (wears glasses)  Dry Eye: Asymptomatic OR clinical or diagnostic observations only OR symptoms relieved by lubricants  Ear Disorders  Ear Disorder (WDL): All ear disorder elements are within defined limits  Respiratory  Respiratory  Respiratory (WDL): All respiratory elements are within defined limits  Cardiovascular  Cardiovascular  Cardiovascular (WDL): Exceptions to WDL (no pacemaker)  Edema: Yes  Edema Limbs: 5 - 10% inter-limb discrepancy in volume or circumference at point of greatest visible difference OR swelling or obscuration of anatomic architecture on close inspection (left 2nd  toe)  Gastrointestinal  Gastrointestinal  Gastrointestinal (WDL): All gastrointestinal elements are within defined limits  Musculoskeletal  Musculoskeletal and Connective Tissue Disorders  Musculoskeletal & Connective (WDL): Exceptions to WDL  Arthralgia: Mild pain (low back, hip arthritis)  Integumentary  Integumentary  Integumentary (WDL): Exceptions to WDL (healing left breast incision)  Neurological  Neurosensory  Neurosensory (WDL): All neurosensory elements are within defined limits  Genitourinary/Reproductive  Genitourinary  Genitourinary (WDL): All genitourinary elements are within defined limits  Lymphatic  Lymph System Disorders  Lymph (WDL): All lymph elements are within defined limits  Pain  Pain Score: No Pain (0)  AUA Assessment                                                              Accompanied by  Accompanied By: self only    ECOG Status: 0    Imaging: Reviewed    Pathology: Reviewed    Objective:      PHYSICAL EXAMINATION:    /74 (BP Location: Right arm, Patient Position: Sitting, Cuff Size: Adult Regular)   Pulse 84   Temp 97.5  F (36.4  C) (Oral)   Resp 16   Wt 80.7 kg (177 lb 14.4 oz)   SpO2 100%     Gen: Alert, in NAD  Eyes: PERRL, EOMI, sclera anicteric  Neck: Supple, full ROM, no LAD  Chest: The breasts and nipples are symmetrical bilaterally.  There is no evidence of nipple discharge.  There is no palpable lump or mass bilaterally in the breast, axillary, and supraclavicular region.  There is well-healed surgical scar in the left breast consistent with a recent history of surgery.  Pulm: No wheezing, stridor or respiratory distress  CV: Well-perfused, no cyanosis, no pedal edema  Back: No step-offs or pain to palpation along the thoracolumbar spine  Rectal: Deferred  : Deferred  Musculoskeletal: Normal muscle bulk and tone  Skin: Normal color and turgor  Neurologic: A/Ox3, CN II-XII intact, normal gait and station  Psychiatric: Appropriate mood and affect    Intent of  Therapy: Curative  We recommend adjuvant radiation as part of her breast conserving therapy to decrease her chance of local recurrence to the single digits. ROM stretches and skin care were discussed with the patient. She understands that these maneuvers need to continue for 6 months following completion of radiation as skin and muscle fibrosis continue to form for weeks to months following completion of therapy.      Side effects that may occur during or within weeks after radiation therapy    Fatigue and general weakness  Darkening, irritation, itchiness, redness, dryness, erythema, peeling, scabbing, ulceration and contraction of the skin of the breast and chest  Swelling of the breast  Loss of armpit hair  Lung irritation  Decrease in appetite    Side effects that may occur months or years after radiation therapy    Development of another tumor or cancer  Thickening, telangiectasias (development of spider like blood vessels in the skin) and ulceration of the skin of the breast and chest  Firming, fibrosis (scar tissue), fat necrosis, and distortion of the breast  Poor healing after a trauma or surgery in the irradiated area  Nerve damage resulting in loss of arm strength and sensation  Coronary artery blockage causing angina pain or a heart attack  Lung inflammation of fibrosis causing cough, fever and shortness of breath  Fracture of the ribs  Swellingof an arm and hand    The risks, benefits and alternatives to radiation therapy were outlined with the patient. All questions were answered and a consent was signed.     Impression     Left breast DCIS, ER negative, status post lumpectomy with close margin and reexcision showed no evidence of residual disease.    Assessment & Plan:     I have personally reviewed her upcoming medical record today.  I have also reviewed her most recent radiology study including mammogram.  The possible treatment options including surgery, systemic therapy, and radiation therapy has  been discussed with patient in detail and at great lengths.  The possible risks and side effects of radiation therapy has also been explained to the patient.  Questions are answered to patient satisfaction. I have informed the patient that postoperative radiation therapy will reduce 50-70% risks of developing invasive cancer in the future. There is no survival benefit after postoperative radiation therapy. The patient might have approximately 1% risks per year to develop invasive cancer.  It is reasonable to consider adjuvant radiation therapy for patient with palpable mass, large size, high-grade, close or involved margins and age less than 50 years.    The pros and cons of different treatment options have been discussed with the patient in detail and at the greatest length. Questions are answered to patient's satisfaction. The patient is in a good health status and anxious about having cancer diagnosis in the future. She therefore elected to proceed with post-operative radiation therapy as her treatment of choice for her left breast DCIS.  She is scheduled return to radiation oncology next week for simulation.  I plan to give her radiation therapy to a total dose of 4005 cGy in 15 treatments targeted to the left breast only.    Again, thank you very much for the referral and allowing me to participate in the care of this patient.  If you have any questions or concerns about this consultation, please do not hesitate to call.  I spent approximately 45 minutes today with the patient and 80% time was used for counseling.      Sincerely,          Ana Maria MD, PhD  Department of Radiation Oncology   [facility] Radiation Oncology  Tel: [phone]  Page: [phone]    [facility]  [address] [place] [address]     [facility]   [address]   [place] [address]    CC:  Patient Care Team:  No Ref-Primary, Physician as PCP - Maxwell Rodriguez as Referring Physician  Norman  MD Dwight as Ana Mueller MD as MD (Radiation Oncology)        No pacemaker/ICD, Inspire implantable device for sleep apnea in right chest  No prior radiation therapy treatments  Postmenopausal    Oncology Rooming Note    March 13, 2024 11:40 AM   Alisa Rosales is a 63 year old female who presents for:    Chief Complaint   Patient presents with     Oncology Clinic Visit     Consult with Dr. Maria     Initial Vitals: /74 (BP Location: Right arm, Patient Position: Sitting, Cuff Size: Adult Regular)   Pulse 84   Temp 97.5  F (36.4  C) (Oral)   Resp 16   Wt 80.7 kg (177 lb 14.4 oz)   SpO2 100%  There is no height or weight on file to calculate BMI. There is no height or weight on file to calculate BSA.  No Pain (0) Comment: Data Unavailable   No LMP recorded.  Allergies reviewed: Yes  Medications reviewed: Yes    Medications: Medication refills not needed today.  Pharmacy name entered into [employer]:    [id number] [place] [place] [facility] [address] [id number]  [place], [place] - [address] [facility] [address]    Frailty Screening:   Is the patient here for a new oncology consult visit in cancer care? 1. Yes. Over the past month, have you experienced difficulty or required a caregiver to assist with:   1. Balance, walking or general mobility (including any falls)? NO  2. Completion of self-care tasks such as bathing, dressing, toileting, grooming/hygiene?  NO  3. Concentration or memory that affects your daily life?  NO       Clinical concerns: Patient here ambulatory for radiation consult for her breast cancer.  Patient states she had to have a couple of different surgeries but is healing well.  15 minutes spent in review of radiation process and potential side effects.  Written information given for review.  Seen by Dr. Maria.  Plan return to clinic for follow-up after CT simulation as directed by provider.   Dr. Maria was notified.    Radiation Therapy Patient  Education    Person involved with teaching: Patient    Patient educational needs for self management of treatment-related side effects assessment completed.  [facility] Patient Ed tab contains Patient Learning Assessment    Education Materials Given  Managing Side Effects of Radiation Therapy: Care Instructions, Learning About External Beam Radiation Treatment, Dealing With Being Tired From Cancer Treatment: Care Instructions, Radiation Treatment For Cancer, Radiation Therapy to the Breast Guidelines, Exercising After a Mastectomy: Care Instructions, Oncology Supportive Care Services , Welcome Letter, Insurance PA Information, and Map [employer]    Educational Topics Discussed  Side effects expected, Skin care, and When to call MD/RN    Response To Teaching  More review necessary    GYN Only  Vaginal Dilator-given and educated: N/A    Referrals sent: None    Chemotherapy?  No, Dr. Dwight Austin [facility].          Becky Clayton, WALTER                Again, thank you for allowing me to participate in the care of your patient.        Sincerely,        Ana Maria MD

## 2024-03-13 NOTE — PROGRESS NOTES
No pacemaker/ICD, Inspire implantable device for sleep apnea in right chest  No prior radiation therapy treatments  Postmenopausal    Oncology Rooming Note    March 13, 2024 11:40 AM   Alisa Rosales is a 63 year old female who presents for:    Chief Complaint   Patient presents with    Oncology Clinic Visit     Consult with Dr. Maria     Initial Vitals: /74 (BP Location: Right arm, Patient Position: Sitting, Cuff Size: Adult Regular)   Pulse 84   Temp 97.5  F (36.4  C) (Oral)   Resp 16   Wt 80.7 kg (177 lb 14.4 oz)   SpO2 100%  There is no height or weight on file to calculate BMI. There is no height or weight on file to calculate BSA.  No Pain (0) Comment: Data Unavailable   No LMP recorded.  Allergies reviewed: Yes  Medications reviewed: Yes    Medications: Medication refills not needed today.  Pharmacy name entered into [employer]:    [id number] [place] [place] [facility] [address]  [id number] [place], [place] - [address] [facility] [address]    Frailty Screening:   Is the patient here for a new oncology consult visit in cancer care? 1. Yes. Over the past month, have you experienced difficulty or required a caregiver to assist with:   1. Balance, walking or general mobility (including any falls)? NO  2. Completion of self-care tasks such as bathing, dressing, toileting, grooming/hygiene?  NO  3. Concentration or memory that affects your daily life?  NO       Clinical concerns: Patient here ambulatory for radiation consult for her breast cancer.  Patient states she had to have a couple of different surgeries but is healing well.  15 minutes spent in review of radiation process and potential side effects.  Written information given for review.  Seen by Dr. Maria.  Plan return to clinic for follow-up after CT simulation as directed by provider.   Dr. Maria was notified.    Radiation Therapy Patient Education    Person involved with teaching: Patient    Patient educational needs  for self management of treatment-related side effects assessment completed.  [facility] Patient Ed tab contains Patient Learning Assessment    Education Materials Given  Managing Side Effects of Radiation Therapy: Care Instructions, Learning About External Beam Radiation Treatment, Dealing With Being Tired From Cancer Treatment: Care Instructions, Radiation Treatment For Cancer, Radiation Therapy to the Breast Guidelines, Exercising After a Mastectomy: Care Instructions, Oncology Supportive Care Services , Welcome Letter, Insurance PA Information, and Map [email or web address]    Educational Topics Discussed  Side effects expected, Skin care, and When to call MD/RN    Response To Teaching  More review necessary    GYN Only  Vaginal Dilator-given and educated: N/A    Referrals sent: None    Chemotherapy?  No, Dr. Dwight Austin [facility].          Becky Clayton RN

## 2024-03-18 ENCOUNTER — ALLIED HEALTH/NURSE VISIT (OUTPATIENT)
Dept: RADIATION ONCOLOGY | Facility: HOSPITAL | Age: 64
End: 2024-03-18
Attending: RADIOLOGY
Payer: MEDICARE

## 2024-03-18 PROCEDURE — 77334 RADIATION TREATMENT AID(S): CPT | Performed by: RADIOLOGY

## 2024-03-18 PROCEDURE — 77290 THER RAD SIMULAJ FIELD CPLX: CPT | Mod: 26 | Performed by: RADIOLOGY

## 2024-03-18 PROCEDURE — 77334 RADIATION TREATMENT AID(S): CPT | Mod: 26 | Performed by: RADIOLOGY

## 2024-03-18 PROCEDURE — 77290 THER RAD SIMULAJ FIELD CPLX: CPT | Performed by: RADIOLOGY

## 2024-03-18 NOTE — PROCEDURES
SIMULATION NOTE:       DIAGNOSIS: Left breast DCIS, ER negative, status post lumpectomy with close margin and reexcision showed no evidence of residual disease.     INDICATION:  Postoperative radiation therapy is recommended to reduce the likelihood of cancer recurrence.    CONSENT:  The possible risks and the side effects of radiation therapy have been discussed with patient in detail and at great length.  Questions are answered to patient's satisfaction.  Written consent was obtained.    SIMULATION:  The patient is in a supine position with a wing breast board to help keep the same position during the daily radiation therapy.  Tentative isocenter is set up in the center of the thoracic region.  We will acquire CT information to help us to better locate target and design radiation therapy field.    We are also going to obtain 4-D CT and use respiratory gating (or breath holdidng) technique to help us to reduce the radiation dose to the heart and lung.      BLOCKS:  Custom blocks will be drawn to minimize radiation to normal tissues and to protect normal organs including, but not limited to, lungs, heart, liver, bone and soft tissue.    DOSAGE:  I plan to give her radiation therapy to a total dose of 4005 cGy in 15 treatments targeted to the left breast only. I will consider to use 3D conformer technique to help us better locate target and to protect normal tissue.      Ana Maria MD, PhD  Department of Radiation Oncology   [facility] Radiation Oncology  Tel: [phone]  Page: [phone]    [facility]  [address] [place] [address]     [facility]  [address]  [place] [address]

## 2024-03-18 NOTE — PROCEDURES
Clinical Treatment Planning Note    The complex radiotherapy planning will be completed for the patient to plan the treatment for her breast cancer.  The patient had a planning CT earlier today for planning.  The treatment aids were used for planning, including headrest and Wing Board to help keep the same position during the daily radiation therapy.  The therapy planning is necessary to reduce radiotherapy dose to the normal critical organs which are not possible with simple treatment.  In addition, dose to the target and the critical structures requires three-dimensional analysis of the isodose distribution.  The planning will be done to reduce dose to the lungs, spinal cord, liver, and heart.     I will contour the clinical tumor volume  CTV , with expanded volume of planning treatment volume  PTV  on the treatment planning system.  The critical structures will be outlined, including spinal cord, lungs, heart, liver, bone and soft tissue.     Treatment planning will be done on the computer treatment planning system.  The tangential field will be used to achieve optimal coverage of the target volume.  Dose distribution to the above critical structures will be reviewed.  Isodose distribution along with the X, Y, Z plan will also be reviewed.  Custom blocking will be used to shield normal structures.  The beam's eye views will be reviewed and the digital reconstructed image will be reviewed on the planning software.      The patient will receive a total dose of 4005 cGy in 15 treatments targeted to the left breast only using 6-MV or 10-MV photons.       Ana Maria MD, PhD  Department of Radiation Oncology   [facility] Radiation Oncology  Tel: [phone]  Page: [phone]    [facility]  [address] [place] [address]     [place] [id number] [place] [address]

## 2024-03-20 ENCOUNTER — APPOINTMENT (OUTPATIENT)
Dept: RADIATION ONCOLOGY | Facility: CLINIC | Age: 64
End: 2024-03-20
Attending: INTERNAL MEDICINE
Payer: MEDICARE

## 2024-03-20 PROCEDURE — 77300 RADIATION THERAPY DOSE PLAN: CPT | Performed by: RADIOLOGY

## 2024-03-20 PROCEDURE — 77334 RADIATION TREATMENT AID(S): CPT | Performed by: RADIOLOGY

## 2024-03-20 PROCEDURE — 77295 3-D RADIOTHERAPY PLAN: CPT | Performed by: RADIOLOGY

## 2024-03-20 PROCEDURE — 77334 RADIATION TREATMENT AID(S): CPT | Mod: 26 | Performed by: RADIOLOGY

## 2024-03-20 PROCEDURE — 77300 RADIATION THERAPY DOSE PLAN: CPT | Mod: 26 | Performed by: RADIOLOGY

## 2024-03-20 PROCEDURE — 77295 3-D RADIOTHERAPY PLAN: CPT | Mod: 26 | Performed by: RADIOLOGY

## 2024-03-27 ENCOUNTER — OFFICE VISIT (OUTPATIENT)
Dept: RADIATION ONCOLOGY | Facility: CLINIC | Age: 64
End: 2024-03-27
Attending: RADIOLOGY
Payer: MEDICARE

## 2024-03-27 VITALS
DIASTOLIC BLOOD PRESSURE: 76 MMHG | OXYGEN SATURATION: 100 % | TEMPERATURE: 98.2 F | WEIGHT: 174.9 LBS | RESPIRATION RATE: 16 BRPM | SYSTOLIC BLOOD PRESSURE: 138 MMHG | HEART RATE: 81 BPM

## 2024-03-27 DIAGNOSIS — D05.12 DUCTAL CARCINOMA IN SITU (DCIS) OF LEFT BREAST: Primary | ICD-10-CM

## 2024-03-27 PROCEDURE — 77387 GUIDANCE FOR RADJ TX DLVR: CPT | Performed by: RADIOLOGY

## 2024-03-27 PROCEDURE — 77280 THER RAD SIMULAJ FIELD SMPL: CPT | Performed by: RADIOLOGY

## 2024-03-27 PROCEDURE — 77280 THER RAD SIMULAJ FIELD SMPL: CPT | Mod: 26 | Performed by: RADIOLOGY

## 2024-03-27 PROCEDURE — 77412 RADIATION TX DELIVERY LVL 3: CPT | Performed by: RADIOLOGY

## 2024-03-27 RX ORDER — DOCUSATE SODIUM 250 MG
CAPSULE ORAL
COMMUNITY
Start: 2023-12-06

## 2024-03-27 RX ORDER — PRUCALOPRIDE 1 MG/1
1 TABLET, FILM COATED ORAL DAILY
COMMUNITY
Start: 2023-12-06

## 2024-03-27 ASSESSMENT — PAIN SCALES - GENERAL: PAINLEVEL: MODERATE PAIN (4)

## 2024-03-27 NOTE — PROGRESS NOTES
RADIATION ONCOLOGY WEEKLY TREATMENT VISIT NOTE      Assessment / Impression       Visit Dx:  (D05.12) Ductal carcinoma in situ (DCIS) of left breast  (primary encounter diagnosis)    Tolerating radiation therapy well.  All questions and concerns addressed.    Plan:     Continue radiation treatment as prescribed.    Subjective:      HPI: Alisa Rosales is a 63 year old female with  Ductal carcinoma in situ (DCIS) of left breast [D05.12]    The following portions of the patient's history were reviewed and updated as appropriate: allergies, current medications, past family history, past medical history, past social history, past surgical history and problem list.    Assessment                  Body Site:  Breast                           Site: Lt. Breast  Stereotactic Radiosurgery: No  Concurrent Therapy: No  Today's Dose: 267  Total Dose for Breast: 4005  Today's Fraction/Total Fraction Breast: 1/15  Drainage: 0: Absent                                   Sexuality Alteration                    Emotional Alteration    Copin: Effective  Comfort Alteration   KPS: 100 % Normal, no complaints  Fatigue (ONS scale): 0: No Fatigue  Pain Location: chronic low back  Pain Intensity. Rate degree of pain ranging from 0 (no pain) to 10 (severe pain): 0-4  Pain Description: Dull intermittent - Dull type of ache which is intermittent  Pain Intervention: 2: Nonsteroidal anti-inflammatory agents or non-opiods  Effectiveness of pain intervention: 3: Pain relieved 75%   Nutrition Alteration   Anorexia: 0: None  Nausea: 0: None  Vomitin: None  Weight: 79.3 kg (174 lb 14.4 oz)  Skin Alteration   Skin Sensation: 0: No problem  Skin Reaction: 0: None (radiaplex given with verbal/written instruction)  AUA Assessment                                           Accompanied by       Objective:     Exam: no Erythema.    Vitals:    24 1430   BP: 138/76   Pulse: 81   Resp: 16   Temp: 98.2  F (36.8  C)   TempSrc: Oral    SpO2: 100%   Weight: 79.3 kg (174 lb 14.4 oz)   PainSc: Moderate Pain (4)   PainLoc: Low Back       Wt Readings from Last 8 Encounters:   03/27/24 79.3 kg (174 lb 14.4 oz)   03/13/24 80.7 kg (177 lb 14.4 oz)       General: Alert and oriented, in no acute distress  Alisa has no Erythema.  Aria chart and setup information reviewed    Ana Maria MD

## 2024-03-27 NOTE — LETTER
3/27/2024         RE: Alisa Rosales  [address] [facility] [address]        Dear Colleague,    Thank you for referring your patient, Alisa Rosales, to the [facility] RADIATION ONCOLOGY [place]. Please see a copy of my visit note below.    RADIATION ONCOLOGY WEEKLY TREATMENT VISIT NOTE      Assessment / Impression       Visit Dx:  (D05.12) Ductal carcinoma in situ (DCIS) of left breast  (primary encounter diagnosis)    Tolerating radiation therapy well.  All questions and concerns addressed.    Plan:     Continue radiation treatment as prescribed.    Subjective:      HPI: Alisa Rosales is a 63 year old female with  Ductal carcinoma in situ (DCIS) of left breast [D05.12]    The following portions of the patient's history were reviewed and updated as appropriate: allergies, current medications, past family history, past medical history, past social history, past surgical history and problem list.    Assessment                  Body Site:  Breast                           Site: Lt. Breast  Stereotactic Radiosurgery: No  Concurrent Therapy: No  Today's Dose: 267  Total Dose for Breast: 4005  Today's Fraction/Total Fraction Breast: 1/15  Drainage: 0: Absent                                   Sexuality Alteration                    Emotional Alteration    Copin: Effective  Comfort Alteration   KPS: 100 % Normal, no complaints  Fatigue (ONS scale): 0: No Fatigue  Pain Location: chronic low back  Pain Intensity. Rate degree of pain ranging from 0 (no pain) to 10 (severe pain): 0-4  Pain Description: Dull intermittent - Dull type of ache which is intermittent  Pain Intervention: 2: Nonsteroidal anti-inflammatory agents or non-opiods  Effectiveness of pain intervention: 3: Pain relieved 75%   Nutrition Alteration   Anorexia: 0: None  Nausea: 0: None  Vomitin: None  Weight: 79.3 kg (174 lb 14.4 oz)  Skin Alteration   Skin Sensation: 0: No problem  Skin Reaction: 0: None (radiaplex  given with verbal/written instruction)  AUA Assessment                                           Accompanied by       Objective:     Exam: no Erythema.    Vitals:    03/27/24 1430   BP: 138/76   Pulse: 81   Resp: 16   Temp: 98.2  F (36.8  C)   TempSrc: Oral   SpO2: 100%   Weight: 79.3 kg (174 lb 14.4 oz)   PainSc: Moderate Pain (4)   PainLoc: Low Back       Wt Readings from Last 8 Encounters:   03/27/24 79.3 kg (174 lb 14.4 oz)   03/13/24 80.7 kg (177 lb 14.4 oz)       General: Alert and oriented, in no acute distress  Alisa has no Erythema.  Aria chart and setup information reviewed    Ana Maria MD      Again, thank you for allowing me to participate in the care of your patient.        Sincerely,        Ana Maria MD

## 2024-03-28 ENCOUNTER — APPOINTMENT (OUTPATIENT)
Dept: RADIATION ONCOLOGY | Facility: CLINIC | Age: 64
End: 2024-03-28
Attending: RADIOLOGY
Payer: MEDICARE

## 2024-03-28 PROCEDURE — 77412 RADIATION TX DELIVERY LVL 3: CPT

## 2024-03-28 PROCEDURE — 77387 GUIDANCE FOR RADJ TX DLVR: CPT | Performed by: RADIOLOGY

## 2024-03-28 PROCEDURE — 77387 GUIDANCE FOR RADJ TX DLVR: CPT

## 2024-03-29 ENCOUNTER — APPOINTMENT (OUTPATIENT)
Dept: RADIATION ONCOLOGY | Facility: CLINIC | Age: 64
End: 2024-03-29
Attending: RADIOLOGY
Payer: MEDICARE

## 2024-03-29 PROCEDURE — 77387 GUIDANCE FOR RADJ TX DLVR: CPT | Performed by: RADIOLOGY

## 2024-03-29 PROCEDURE — 77387 GUIDANCE FOR RADJ TX DLVR: CPT

## 2024-03-29 PROCEDURE — 77412 RADIATION TX DELIVERY LVL 3: CPT

## 2024-04-01 ENCOUNTER — APPOINTMENT (OUTPATIENT)
Dept: RADIATION ONCOLOGY | Facility: CLINIC | Age: 64
End: 2024-04-01
Attending: RADIOLOGY
Payer: MEDICARE

## 2024-04-01 PROCEDURE — 77412 RADIATION TX DELIVERY LVL 3: CPT

## 2024-04-01 PROCEDURE — 77387 GUIDANCE FOR RADJ TX DLVR: CPT | Performed by: RADIOLOGY

## 2024-04-01 PROCEDURE — 77387 GUIDANCE FOR RADJ TX DLVR: CPT

## 2024-04-02 ENCOUNTER — APPOINTMENT (OUTPATIENT)
Dept: RADIATION ONCOLOGY | Facility: CLINIC | Age: 64
End: 2024-04-02
Attending: RADIOLOGY
Payer: MEDICARE

## 2024-04-02 PROCEDURE — 77387 GUIDANCE FOR RADJ TX DLVR: CPT | Performed by: RADIOLOGY

## 2024-04-02 PROCEDURE — 77412 RADIATION TX DELIVERY LVL 3: CPT

## 2024-04-02 PROCEDURE — 77427 RADIATION TX MANAGEMENT X5: CPT | Performed by: RADIOLOGY

## 2024-04-02 PROCEDURE — 77336 RADIATION PHYSICS CONSULT: CPT | Performed by: RADIOLOGY

## 2024-04-02 PROCEDURE — 77387 GUIDANCE FOR RADJ TX DLVR: CPT

## 2024-04-03 ENCOUNTER — OFFICE VISIT (OUTPATIENT)
Dept: RADIATION ONCOLOGY | Facility: CLINIC | Age: 64
End: 2024-04-03
Attending: RADIOLOGY
Payer: MEDICARE

## 2024-04-03 VITALS
OXYGEN SATURATION: 100 % | DIASTOLIC BLOOD PRESSURE: 93 MMHG | TEMPERATURE: 97.8 F | HEART RATE: 72 BPM | SYSTOLIC BLOOD PRESSURE: 141 MMHG | WEIGHT: 175.8 LBS | RESPIRATION RATE: 16 BRPM

## 2024-04-03 DIAGNOSIS — D05.12 DUCTAL CARCINOMA IN SITU (DCIS) OF LEFT BREAST: Primary | ICD-10-CM

## 2024-04-03 PROCEDURE — 77387 GUIDANCE FOR RADJ TX DLVR: CPT | Performed by: RADIOLOGY

## 2024-04-03 PROCEDURE — 77412 RADIATION TX DELIVERY LVL 3: CPT | Performed by: RADIOLOGY

## 2024-04-03 NOTE — LETTER
4/3/2024         RE: Alisa Rosales  [address] [address] [facility] [address]        Dear Colleague,    Thank you for referring your patient, Alisa Rosales, to the [facility] RADIATION ONCOLOGY [place]. Please see a copy of my visit note below.    RADIATION ONCOLOGY WEEKLY TREATMENT VISIT NOTE      Assessment / Impression       Visit Dx:  (D05.12) Ductal carcinoma in situ (DCIS) of left breast  (primary encounter diagnosis)    Tolerating radiation therapy well.  All questions and concerns addressed.    Plan:     Continue radiation treatment as prescribed.    Discussed with patient about skin care.    Subjective:      HPI: Alisa Rosales is a 63 year old female with  Ductal carcinoma in situ (DCIS) of left breast [D05.12]    The following portions of the patient's history were reviewed and updated as appropriate: allergies, current medications, past family history, past medical history, past social history, past surgical history and problem list.    Assessment                  Body Site:  Breast                           Site: Lt. Breast  Stereotactic Radiosurgery: No  Concurrent Therapy: No  Today's Dose: 1602  Total Dose for Breast: 4005  Today's Fraction/Total Fraction Breast: 6/15  Drainage: 0: Absent                                   Sexuality Alteration                    Emotional Alteration    Copin: Effective  Comfort Alteration   KPS: 100 % Normal, no complaints  Fatigue (ONS scale): 0: No Fatigue  Pain Location: chronic low back  Pain Intensity. Rate degree of pain ranging from 0 (no pain) to 10 (severe pain): 0-4  Pain Description: Dull intermittent - Dull type of ache which is intermittent  Pain Intervention: 2: Nonsteroidal anti-inflammatory agents or non-opiods  Effectiveness of pain intervention: 3: Pain relieved 75%   Nutrition Alteration   Anorexia: 0: None  Nausea: 0: None  Vomitin: None  Weight: 79.7 kg (175 lb 12.8 oz)  Skin Alteration   Skin Sensation: 0: No  problem  Skin Reaction: 0: None  AUA Assessment                                           Accompanied by       Objective:     Exam:  mild Erythema.    Vitals:    04/03/24 1121   BP: (!) 141/93   Pulse: 72   Resp: 16   Temp: 97.8  F (36.6  C)   TempSrc: Oral   SpO2: 100%   Weight: 79.7 kg (175 lb 12.8 oz)       Wt Readings from Last 8 Encounters:   04/03/24 79.7 kg (175 lb 12.8 oz)   03/27/24 79.3 kg (174 lb 14.4 oz)   03/13/24 80.7 kg (177 lb 14.4 oz)       General: Alert and oriented, in no acute distress  Alisa has mild Erythema.  Aria chart and setup information reviewed    Ana Maria MD      Again, thank you for allowing me to participate in the care of your patient.        Sincerely,        Ana Maria MD

## 2024-04-03 NOTE — PROGRESS NOTES
RADIATION ONCOLOGY WEEKLY TREATMENT VISIT NOTE      Assessment / Impression       Visit Dx:  (D05.12) Ductal carcinoma in situ (DCIS) of left breast  (primary encounter diagnosis)    Tolerating radiation therapy well.  All questions and concerns addressed.    Plan:     Continue radiation treatment as prescribed.    Discussed with patient about skin care.    Subjective:      HPI: Alisa Rosales is a 63 year old female with  Ductal carcinoma in situ (DCIS) of left breast [D05.12]    The following portions of the patient's history were reviewed and updated as appropriate: allergies, current medications, past family history, past medical history, past social history, past surgical history and problem list.    Assessment                  Body Site:  Breast                           Site: Lt. Breast  Stereotactic Radiosurgery: No  Concurrent Therapy: No  Today's Dose: 1602  Total Dose for Breast: 4005  Today's Fraction/Total Fraction Breast: 6/15  Drainage: 0: Absent                                   Sexuality Alteration                    Emotional Alteration    Copin: Effective  Comfort Alteration   KPS: 100 % Normal, no complaints  Fatigue (ONS scale): 0: No Fatigue  Pain Location: chronic low back  Pain Intensity. Rate degree of pain ranging from 0 (no pain) to 10 (severe pain): 0-4  Pain Description: Dull intermittent - Dull type of ache which is intermittent  Pain Intervention: 2: Nonsteroidal anti-inflammatory agents or non-opiods  Effectiveness of pain intervention: 3: Pain relieved 75%   Nutrition Alteration   Anorexia: 0: None  Nausea: 0: None  Vomitin: None  Weight: 79.7 kg (175 lb 12.8 oz)  Skin Alteration   Skin Sensation: 0: No problem  Skin Reaction: 0: None  AUA Assessment                                           Accompanied by       Objective:     Exam:  mild Erythema.    Vitals:    24 1121   BP: (!) 141/93   Pulse: 72   Resp: 16   Temp: 97.8  F (36.6  C)   TempSrc: Oral    SpO2: 100%   Weight: 79.7 kg (175 lb 12.8 oz)       Wt Readings from Last 8 Encounters:   04/03/24 79.7 kg (175 lb 12.8 oz)   03/27/24 79.3 kg (174 lb 14.4 oz)   03/13/24 80.7 kg (177 lb 14.4 oz)       General: Alert and oriented, in no acute distress  Alisa has mild Erythema.  Aria chart and setup information reviewed    Ana Maria MD

## 2024-04-04 ENCOUNTER — APPOINTMENT (OUTPATIENT)
Dept: RADIATION ONCOLOGY | Facility: CLINIC | Age: 64
End: 2024-04-04
Attending: RADIOLOGY
Payer: MEDICARE

## 2024-04-04 PROCEDURE — 77387 GUIDANCE FOR RADJ TX DLVR: CPT | Performed by: STUDENT IN AN ORGANIZED HEALTH CARE EDUCATION/TRAINING PROGRAM

## 2024-04-04 PROCEDURE — 77412 RADIATION TX DELIVERY LVL 3: CPT | Performed by: STUDENT IN AN ORGANIZED HEALTH CARE EDUCATION/TRAINING PROGRAM

## 2024-04-05 ENCOUNTER — APPOINTMENT (OUTPATIENT)
Dept: RADIATION ONCOLOGY | Facility: CLINIC | Age: 64
End: 2024-04-05
Attending: RADIOLOGY
Payer: MEDICARE

## 2024-04-05 PROCEDURE — 77412 RADIATION TX DELIVERY LVL 3: CPT | Performed by: STUDENT IN AN ORGANIZED HEALTH CARE EDUCATION/TRAINING PROGRAM

## 2024-04-05 PROCEDURE — 77387 GUIDANCE FOR RADJ TX DLVR: CPT | Performed by: STUDENT IN AN ORGANIZED HEALTH CARE EDUCATION/TRAINING PROGRAM

## 2024-04-08 ENCOUNTER — APPOINTMENT (OUTPATIENT)
Dept: RADIATION ONCOLOGY | Facility: CLINIC | Age: 64
End: 2024-04-08
Attending: RADIOLOGY
Payer: MEDICARE

## 2024-04-08 PROCEDURE — 77412 RADIATION TX DELIVERY LVL 3: CPT | Performed by: RADIOLOGY

## 2024-04-08 PROCEDURE — 77387 GUIDANCE FOR RADJ TX DLVR: CPT | Performed by: RADIOLOGY

## 2024-04-09 ENCOUNTER — APPOINTMENT (OUTPATIENT)
Dept: RADIATION ONCOLOGY | Facility: CLINIC | Age: 64
End: 2024-04-09
Attending: RADIOLOGY
Payer: MEDICARE

## 2024-04-09 PROCEDURE — 77336 RADIATION PHYSICS CONSULT: CPT | Performed by: RADIOLOGY

## 2024-04-09 PROCEDURE — 77387 GUIDANCE FOR RADJ TX DLVR: CPT | Performed by: STUDENT IN AN ORGANIZED HEALTH CARE EDUCATION/TRAINING PROGRAM

## 2024-04-09 PROCEDURE — 77427 RADIATION TX MANAGEMENT X5: CPT | Performed by: RADIOLOGY

## 2024-04-09 PROCEDURE — 77412 RADIATION TX DELIVERY LVL 3: CPT | Performed by: STUDENT IN AN ORGANIZED HEALTH CARE EDUCATION/TRAINING PROGRAM

## 2024-04-10 ENCOUNTER — APPOINTMENT (OUTPATIENT)
Dept: RADIATION ONCOLOGY | Facility: CLINIC | Age: 64
End: 2024-04-10
Attending: RADIOLOGY
Payer: MEDICARE

## 2024-04-10 VITALS
OXYGEN SATURATION: 100 % | WEIGHT: 174.6 LBS | RESPIRATION RATE: 16 BRPM | TEMPERATURE: 98 F | DIASTOLIC BLOOD PRESSURE: 66 MMHG | HEART RATE: 87 BPM | SYSTOLIC BLOOD PRESSURE: 136 MMHG

## 2024-04-10 DIAGNOSIS — D05.12 DUCTAL CARCINOMA IN SITU (DCIS) OF LEFT BREAST: Primary | ICD-10-CM

## 2024-04-10 PROCEDURE — 77387 GUIDANCE FOR RADJ TX DLVR: CPT | Performed by: RADIOLOGY

## 2024-04-10 PROCEDURE — 77412 RADIATION TX DELIVERY LVL 3: CPT | Performed by: RADIOLOGY

## 2024-04-10 ASSESSMENT — PAIN SCALES - GENERAL: PAINLEVEL: NO PAIN (0)

## 2024-04-10 NOTE — PROGRESS NOTES
RADIATION ONCOLOGY WEEKLY TREATMENT VISIT NOTE      Assessment / Impression       Visit Dx:  (D05.12) Ductal carcinoma in situ (DCIS) of left breast  (primary encounter diagnosis)    Tolerating radiation therapy well.  All questions and concerns addressed.    Plan:     Continue radiation treatment as prescribed.    Discussed with the patient about skin care.    Subjective:      HPI: Alisa Rosales is a 63 year old female with  Ductal carcinoma in situ (DCIS) of left breast [D05.12]    The following portions of the patient's history were reviewed and updated as appropriate: allergies, current medications, past family history, past medical history, past social history, past surgical history and problem list.    Assessment                  Body Site:  Breast                           Site: Lt. Breast  Stereotactic Radiosurgery: No  Concurrent Therapy: No  Today's Dose: 2937  Total Dose for Breast: 4005  Today's Fraction/Total Fraction Breast: 11/15  Drainage: 0: Absent                                   Sexuality Alteration                    Emotional Alteration    Copin: Effective  Comfort Alteration   KPS: 100 % Normal, no complaints  Fatigue (ONS scale): 0: No Fatigue  Pain Location: chronic low back  Pain Intensity. Rate degree of pain ranging from 0 (no pain) to 10 (severe pain): 0-4  Pain Description: Dull intermittent - Dull type of ache which is intermittent  Pain Intervention: 2: Nonsteroidal anti-inflammatory agents or non-opiods  Effectiveness of pain intervention: 3: Pain relieved 75%   Nutrition Alteration   Anorexia: 0: None  Nausea: 0: None  Vomitin: None  Weight: 79.2 kg (174 lb 9.6 oz)  Skin Alteration   Skin Sensation: 0: No problem  Skin Reaction: 2: Bright erythema  AUA Assessment                                           Accompanied by       Objective:     Exam: mild, moderate Erythema.    Vitals:    04/10/24 1131   BP: 136/66   Pulse: 87   Resp: 16   Temp: 98  F (36.7  C)    TempSrc: Oral   SpO2: 100%   Weight: 79.2 kg (174 lb 9.6 oz)   PainSc: No Pain (0)       Wt Readings from Last 8 Encounters:   04/10/24 79.2 kg (174 lb 9.6 oz)   04/03/24 79.7 kg (175 lb 12.8 oz)   03/27/24 79.3 kg (174 lb 14.4 oz)   03/13/24 80.7 kg (177 lb 14.4 oz)       General: Alert and oriented, in no acute distress  Alisa has mild, moderate Erythema.  Aria chart and setup information reviewed    Ana Maria MD

## 2024-04-10 NOTE — LETTER
4/10/2024         RE: Alisa Rosales  [address] [facility] [address]        Dear Colleague,    Thank you for referring your patient, Alisa Rosales, to the [facility] RADIATION ONCOLOGY [place]. Please see a copy of my visit note below.    RADIATION ONCOLOGY WEEKLY TREATMENT VISIT NOTE      Assessment / Impression       Visit Dx:  (D05.12) Ductal carcinoma in situ (DCIS) of left breast  (primary encounter diagnosis)    Tolerating radiation therapy well.  All questions and concerns addressed.    Plan:     Continue radiation treatment as prescribed.    Discussed with the patient about skin care.    Subjective:      HPI: Alisa Rosales is a 63 year old female with  Ductal carcinoma in situ (DCIS) of left breast [D05.12]    The following portions of the patient's history were reviewed and updated as appropriate: allergies, current medications, past family history, past medical history, past social history, past surgical history and problem list.    Assessment                  Body Site:  Breast                           Site: Lt. Breast  Stereotactic Radiosurgery: No  Concurrent Therapy: No  Today's Dose: 2937  Total Dose for Breast: 4005  Today's Fraction/Total Fraction Breast: 11/15  Drainage: 0: Absent                                   Sexuality Alteration                    Emotional Alteration    Copin: Effective  Comfort Alteration   KPS: 100 % Normal, no complaints  Fatigue (ONS scale): 0: No Fatigue  Pain Location: chronic low back  Pain Intensity. Rate degree of pain ranging from 0 (no pain) to 10 (severe pain): 0-4  Pain Description: Dull intermittent - Dull type of ache which is intermittent  Pain Intervention: 2: Nonsteroidal anti-inflammatory agents or non-opiods  Effectiveness of pain intervention: 3: Pain relieved 75%   Nutrition Alteration   Anorexia: 0: None  Nausea: 0: None  Vomitin: None  Weight: 79.2 kg (174 lb 9.6 oz)  Skin Alteration   Skin Sensation: 0:  No problem  Skin Reaction: 2: Bright erythema  AUA Assessment                                           Accompanied by       Objective:     Exam: mild, moderate Erythema.    Vitals:    04/10/24 1131   BP: 136/66   Pulse: 87   Resp: 16   Temp: 98  F (36.7  C)   TempSrc: Oral   SpO2: 100%   Weight: 79.2 kg (174 lb 9.6 oz)   PainSc: No Pain (0)       Wt Readings from Last 8 Encounters:   04/10/24 79.2 kg (174 lb 9.6 oz)   04/03/24 79.7 kg (175 lb 12.8 oz)   03/27/24 79.3 kg (174 lb 14.4 oz)   03/13/24 80.7 kg (177 lb 14.4 oz)       General: Alert and oriented, in no acute distress  Alisa has mild, moderate Erythema.  Aria chart and setup information reviewed    Ana Maria MD      Again, thank you for allowing me to participate in the care of your patient.        Sincerely,        Ana Maria MD

## 2024-04-11 ENCOUNTER — APPOINTMENT (OUTPATIENT)
Dept: RADIATION ONCOLOGY | Facility: CLINIC | Age: 64
End: 2024-04-11
Attending: RADIOLOGY
Payer: MEDICARE

## 2024-04-11 PROCEDURE — 77387 GUIDANCE FOR RADJ TX DLVR: CPT | Performed by: STUDENT IN AN ORGANIZED HEALTH CARE EDUCATION/TRAINING PROGRAM

## 2024-04-11 PROCEDURE — 77412 RADIATION TX DELIVERY LVL 3: CPT | Performed by: STUDENT IN AN ORGANIZED HEALTH CARE EDUCATION/TRAINING PROGRAM

## 2024-04-12 ENCOUNTER — APPOINTMENT (OUTPATIENT)
Dept: RADIATION ONCOLOGY | Facility: CLINIC | Age: 64
End: 2024-04-12
Attending: RADIOLOGY
Payer: MEDICARE

## 2024-04-12 PROCEDURE — 77387 GUIDANCE FOR RADJ TX DLVR: CPT | Performed by: RADIOLOGY

## 2024-04-12 PROCEDURE — 77412 RADIATION TX DELIVERY LVL 3: CPT

## 2024-04-12 PROCEDURE — 77387 GUIDANCE FOR RADJ TX DLVR: CPT

## 2024-04-15 ENCOUNTER — APPOINTMENT (OUTPATIENT)
Dept: RADIATION ONCOLOGY | Facility: CLINIC | Age: 64
End: 2024-04-15
Attending: RADIOLOGY
Payer: MEDICARE

## 2024-04-15 PROCEDURE — 77387 GUIDANCE FOR RADJ TX DLVR: CPT | Performed by: RADIOLOGY

## 2024-04-15 PROCEDURE — 77412 RADIATION TX DELIVERY LVL 3: CPT | Performed by: RADIOLOGY

## 2024-04-16 ENCOUNTER — ALLIED HEALTH/NURSE VISIT (OUTPATIENT)
Dept: RADIATION ONCOLOGY | Facility: CLINIC | Age: 64
End: 2024-04-16
Attending: RADIOLOGY
Payer: MEDICARE

## 2024-04-16 PROCEDURE — 77387 GUIDANCE FOR RADJ TX DLVR: CPT | Performed by: RADIOLOGY

## 2024-04-16 PROCEDURE — 77427 RADIATION TX MANAGEMENT X5: CPT | Performed by: RADIOLOGY

## 2024-04-16 PROCEDURE — 77336 RADIATION PHYSICS CONSULT: CPT | Performed by: RADIOLOGY

## 2024-04-16 PROCEDURE — 77412 RADIATION TX DELIVERY LVL 3: CPT | Performed by: RADIOLOGY

## 2024-04-16 NOTE — PROGRESS NOTES
Patient here ambulatory for final radiation therapy treatment for her breast cancer.  Reinforced skin cares.  Written discharge instructions reviewed and given to patient.  Follow-up with Dr. Maria in about 4 to 6 weeks.  Instructed patient to make appointments on her way out of the clinic.

## 2024-04-16 NOTE — PROGRESS NOTES
Radiation Treatment Summary          Patient: Alisa Rosales            MRN: [id number]           : 1960        Care Provider: Ana Maria MD         Date of Service: 2024        Dwight Austin MD  [address] [place] [address]               Dear Dr. Austin:     Your patient Mrs. Alisa Rosales completed her radiation therapy on 2024. As you know Ms. Rosales is a 63 year old female with a diagnosis of left breast DCIS, ER negative, status post lumpectomy with close margin and reexcision showed no evidence of residual disease.  The patient received postop radiation therapy for her breast cancer with a total dose of 4005 cGy in 15 treatments given from 3/27/2024 - 2024.  She tolerated radiation therapy well with expected acute side effect.  Patient is scheduled to return to radiation oncology in 4 weeks for a routine post therapy office follow-up.    Again, thank you very much for the referral and allowing me to participate in the care of this patient.  If you have any questions or concerns about this patient, please do not hesitate to call.          Sincerely,    Ana Maria MD, PhD  Department of Radiation Oncology   [facility] Radiation Oncology  Cell: [phone]    [facility]  [address] [place] [address]     [address] [place] [address]    CC:  Patient Care Team:  Prabhu Resendez MD as PCP - General (Family Medicine)  Maxwell Shepherd as Referring Physician  Dwight Austin MD as Ana Mueller MD as MD (Radiation Oncology)  Ana Maria MD as Assigned Cancer Care Provider

## 2024-04-16 NOTE — PROGRESS NOTES
Treatment summary for radiation routed manually to patient care team, Prabhu Resendez MD and Dwight Austin MD on 4/16/2024.

## 2024-04-23 ENCOUNTER — TELEPHONE (OUTPATIENT)
Dept: RADIATION ONCOLOGY | Facility: CLINIC | Age: 64
End: 2024-04-23
Payer: COMMERCIAL

## 2024-04-23 NOTE — TELEPHONE ENCOUNTER
Patient for routine follow-up phone call status post radiation for her breast cancer.  Left message and told patient she did not need to call us back unless she had any questions or concerns.  Callback number for clinic left for patient should she need to call the clinic to change appointments or ask questions.

## 2024-05-22 ENCOUNTER — OFFICE VISIT (OUTPATIENT)
Dept: RADIATION ONCOLOGY | Facility: CLINIC | Age: 64
End: 2024-05-22
Attending: RADIOLOGY
Payer: MEDICARE

## 2024-05-22 VITALS
SYSTOLIC BLOOD PRESSURE: 151 MMHG | TEMPERATURE: 97.6 F | WEIGHT: 170.3 LBS | RESPIRATION RATE: 16 BRPM | OXYGEN SATURATION: 100 % | HEART RATE: 73 BPM | DIASTOLIC BLOOD PRESSURE: 79 MMHG

## 2024-05-22 DIAGNOSIS — D05.12 DUCTAL CARCINOMA IN SITU (DCIS) OF LEFT BREAST: Primary | ICD-10-CM

## 2024-05-22 PROCEDURE — G0463 HOSPITAL OUTPT CLINIC VISIT: HCPCS | Performed by: RADIOLOGY

## 2024-05-22 RX ORDER — SERTRALINE HYDROCHLORIDE 100 MG/1
1 TABLET, FILM COATED ORAL
COMMUNITY
Start: 2024-04-17

## 2024-05-22 RX ORDER — MUPIROCIN 20 MG/G
OINTMENT TOPICAL
COMMUNITY
Start: 2024-04-10

## 2024-05-22 ASSESSMENT — PAIN SCALES - GENERAL: PAINLEVEL: NO PAIN (0)

## 2024-05-22 NOTE — PROGRESS NOTES
[facility] Radiation Oncology Follow Up     Patient: Alisa Rosales  MRN: [id number]  Date of Service: 05/22/2024       DISEASE TREATED:  Left breast DCIS, ER negative, status post lumpectomy with close margin and reexcision showed no evidence of residual disease.       TYPE OF RADIATION THERAPY ADMINISTERED:  4005 cGy in 15 treatments given from 3/27/2024 - 4/16/2024.      INTERVAL SINCE COMPLETION OF RADIATION THERAPY: 1 month.      SUBJECTIVE:  Ms. Rosales is a 63 year old female who has been encouraged usual state of health until recently.  The patient presented with abnormal finding by routine screening mammogram for which she was seeking further evaluation.  The mammogram showed area of indeterminate calcification in the left breast at the 12 o'clock position at middle depth.  Patient underwent stereotactic needle biopsy on 1/2/2024 with pathology showed ductal carcinoma in situ, high nuclear grade, cribriform type with necrosis.  ER receptors was negative.  Patient then proceeded with lumpectomy on 1/5/2024.  The pathology showed a 2.4 cm DCIS with negative resection margin.  DCIS however is less than 1 mm from closest posterior margin and 0.2 cm from anterior margin.  Patient underwent reexcision on 2/8/2024 with pathology showed no evidence of residual disease.  You saw patient on 2/6/2024 and adjuvant radiation therapy was recommended.  Due to the closeness of her home to our facility, the patient is kindly referred to our clinic for consideration of radiation. The patient received postop radiation therapy for her breast cancer with a total dose of 4005 cGy in 15 treatments given from 3/27/2024 - 4/16/2024. She tolerated radiation therapy well with expected acute side effect.     The patient has been recovering well since radiation therapy and denies any pain discomfort related to the treatment at time of evaluation.  She is here for routine post therapy office follow-up.    Medications were  reviewed and are up to date on EPIC.    The following portions of the patient's history were reviewed and updated as appropriate: allergies, current medications, past family history, past medical history, past social history, past surgical history and problem list.    Review of Systems:      General  Constitutional  Constitutional (WDL): All constitutional elements are within defined limits  EENT  Eye Disorders  Eye Disorder (WDL): Exceptions to WDL (wears glasses)  Dry Eye: Asymptomatic OR clinical or diagnostic observations only OR symptoms relieved by lubricants  Ear Disorders  Ear Disorder (WDL): All ear disorder elements are within defined limits  Respiratory  Respiratory  Respiratory (WDL): All respiratory elements are within defined limits  Cardiovascular  Cardiovascular  Cardiovascular (WDL): All cardiovascular elements are within defined limits (no pacemaker)  Gastrointestinal  Gastrointestinal  Gastrointestinal (WDL): All gastrointestinal elements are within defined limits  Musculoskeletal  Musculoskeletal and Connective Tissue Disorders  Musculoskeletal & Connective (WDL): Exceptions to WDL  Arthralgia: Mild pain (low back, hip arthritis)  Integumentary  Integumentary  Integumentary (WDL): Exceptions to WDL (healing left breast radiation dermatitis)  Neurological  Neurosensory  Neurosensory (WDL): All neurosensory elements are within defined limits  Genitourinary/Reproductive  Genitourinary  Genitourinary (WDL): All genitourinary elements are within defined limits  Lymphatic  Lymph System Disorders  Lymph (WDL): All lymph elements are within defined limits  Pain  Pain Score: No Pain (0)  AUA Assessment                                                              Accompanied by  Accompanied By: self only    Objective:      PHYSICAL EXAMINATION:    BP (!) 151/79 (BP Location: Right arm, Patient Position: Sitting, Cuff Size: Adult Regular)   Pulse 73   Temp 97.6  F (36.4  C) (Oral)   Resp 16   Wt 77.2 kg  (170 lb 4.8 oz)   SpO2 100%     Gen: Alert, in NAD  Eyes: PERRL, EOMI, sclera anicteric  Neck: Supple, full ROM, no LAD  Chest: The breasts and nipples are symmetrical bilaterally.  There is no evidence of nipple discharge.  There is no palpable lump or mass bilaterally in the breast, axillary, and supraclavicular region.  There is well-healed surgical scar in the left breast consistent with a recent history of surgery.  Pulm: No wheezing, stridor or respiratory distress  CV: Well-perfused, no cyanosis, no pedal edema  Back: No step-offs or pain to palpation along the thoracolumbar spine  Rectal: Deferred  : Deferred  Musculoskeletal: Normal muscle bulk and tone  Skin: Normal color and turgor  Neurologic: A/Ox3, CN II-XII intact, normal gait and station  Psychiatric: Appropriate mood and affect      Impression     63 year old female with a diagnosis of left breast DCIS, ER negative, status post lumpectomy with close margin and reexcision showed no evidence of residual disease.  The patient received postop radiation therapy for her breast cancer with a total dose of 4005 cGy in 15 treatments given from 3/27/2024 - 4/16/2024.     Assessment & Plan:     1.  The patient has been recovering well since radiation therapy with no clinical evidence of cancer recurrence.  She will continue her long-term follow-up and ongoing care with Dr. Dwight Austin, medical oncology and Dr. Prabhu Resendez, PCP as planned.    2.  Follow-up with radiation oncology as needed.    Face to face time  15 minutes with > 80% spent on consultation, education and coordination of care.    Ana Maria MD  Department of Radiation Oncology   [facility]  Tel: [phone]  Page: [phone]    [facility]  [address]  [place] [address]     [place] [id number] [facility] [place] [address]    CC:  Patient Care Team:  Prabhu Resendez MD as PCP - General (Family Medicine)  Maxwell Shepherd as Referring  Physician  Dwight Austin MD as Ana Mueller MD as MD (Radiation Oncology)  Ana Maria MD as Assigned Cancer Care Provider

## 2024-05-22 NOTE — PROGRESS NOTES
Oncology Rooming Note    May 22, 2024 11:55 AM   Alisa Rosales is a 63 year old female who presents for:    Chief Complaint   Patient presents with    Oncology Clinic Visit     Follow up with Dr. Maria     Initial Vitals: BP (!) 151/79 (BP Location: Right arm, Patient Position: Sitting, Cuff Size: Adult Regular)   Pulse 73   Temp 97.6  F (36.4  C) (Oral)   Resp 16   Wt 77.2 kg (170 lb 4.8 oz)   SpO2 100%  There is no height or weight on file to calculate BMI. There is no height or weight on file to calculate BSA.  No Pain (0) Comment: Data Unavailable   No LMP recorded.  Allergies reviewed: Yes  Medications reviewed: Yes    Medications: Medication refills not needed today.  Pharmacy name entered into [facility]: [id number] - [place], [place] - [address] [facility] [address]    Frailty Screening:   Is the patient here for a new oncology consult visit in cancer care? 2. No      Clinical concerns: Patient here ambulatory for follow-up status post radiation for her breast cancer.  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  Patient denies any new side effects today.  Seen by Dr. Maria.  Plan return to clinic for follow-up as directed by provider.    Dr. Maria was notified.      Becky Clayton RN

## 2024-05-22 NOTE — LETTER
5/22/2024         RE: Alisa Rosales  [address] [id number]        Dear Colleague,    Thank you for referring your patient, Alisa Rosales, to the [facility] RADIATION ONCOLOGY [place]. Please see a copy of my visit note below.    [facility] Radiation Oncology Follow Up     Patient: Alisa Rosales  MRN: [id number]  Date of Service: 05/22/2024       DISEASE TREATED:  Left breast DCIS, ER negative, status post lumpectomy with close margin and reexcision showed no evidence of residual disease.       TYPE OF RADIATION THERAPY ADMINISTERED:  4005 cGy in 15 treatments given from 3/27/2024 - 4/16/2024.      INTERVAL SINCE COMPLETION OF RADIATION THERAPY: 1 month.      SUBJECTIVE:  Ms. Rosales is a 63 year old female who has been encouraged usual state of health until recently.  The patient presented with abnormal finding by routine screening mammogram for which she was seeking further evaluation.  The mammogram showed area of indeterminate calcification in the left breast at the 12 o'clock position at middle depth.  Patient underwent stereotactic needle biopsy on 1/2/2024 with pathology showed ductal carcinoma in situ, high nuclear grade, cribriform type with necrosis.  ER receptors was negative.  Patient then proceeded with lumpectomy on 1/5/2024.  The pathology showed a 2.4 cm DCIS with negative resection margin.  DCIS however is less than 1 mm from closest posterior margin and 0.2 cm from anterior margin.  Patient underwent reexcision on 2/8/2024 with pathology showed no evidence of residual disease.  You saw patient on 2/6/2024 and adjuvant radiation therapy was recommended.  Due to the closeness of her home to our facility, the patient is kindly referred to our clinic for consideration of radiation. The patient received postop radiation therapy for her breast cancer with a total dose of 4005 cGy in 15 treatments given from 3/27/2024 - 4/16/2024. She tolerated radiation  therapy well with expected acute side effect.     The patient has been recovering well since radiation therapy and denies any pain discomfort related to the treatment at time of evaluation.  She is here for routine post therapy office follow-up.    Medications were reviewed and are up to date on EPIC.    The following portions of the patient's history were reviewed and updated as appropriate: allergies, current medications, past family history, past medical history, past social history, past surgical history and problem list.    Review of Systems:      General  Constitutional  Constitutional (WDL): All constitutional elements are within defined limits  EENT  Eye Disorders  Eye Disorder (WDL): Exceptions to WDL (wears glasses)  Dry Eye: Asymptomatic OR clinical or diagnostic observations only OR symptoms relieved by lubricants  Ear Disorders  Ear Disorder (WDL): All ear disorder elements are within defined limits  Respiratory  Respiratory  Respiratory (WDL): All respiratory elements are within defined limits  Cardiovascular  Cardiovascular  Cardiovascular (WDL): All cardiovascular elements are within defined limits (no pacemaker)  Gastrointestinal  Gastrointestinal  Gastrointestinal (WDL): All gastrointestinal elements are within defined limits  Musculoskeletal  Musculoskeletal and Connective Tissue Disorders  Musculoskeletal & Connective (WDL): Exceptions to WDL  Arthralgia: Mild pain (low back, hip arthritis)  Integumentary  Integumentary  Integumentary (WDL): Exceptions to WDL (healing left breast radiation dermatitis)  Neurological  Neurosensory  Neurosensory (WDL): All neurosensory elements are within defined limits  Genitourinary/Reproductive  Genitourinary  Genitourinary (WDL): All genitourinary elements are within defined limits  Lymphatic  Lymph System Disorders  Lymph (WDL): All lymph elements are within defined limits  Pain  Pain Score: No Pain (0)  AUA Assessment                                                               Accompanied by  Accompanied By: self only    Objective:      PHYSICAL EXAMINATION:    BP (!) 151/79 (BP Location: Right arm, Patient Position: Sitting, Cuff Size: Adult Regular)   Pulse 73   Temp 97.6  F (36.4  C) (Oral)   Resp 16   Wt 77.2 kg (170 lb 4.8 oz)   SpO2 100%     Gen: Alert, in NAD  Eyes: PERRL, EOMI, sclera anicteric  Neck: Supple, full ROM, no LAD  Chest: The breasts and nipples are symmetrical bilaterally.  There is no evidence of nipple discharge.  There is no palpable lump or mass bilaterally in the breast, axillary, and supraclavicular region.  There is well-healed surgical scar in the left breast consistent with a recent history of surgery.  Pulm: No wheezing, stridor or respiratory distress  CV: Well-perfused, no cyanosis, no pedal edema  Back: No step-offs or pain to palpation along the thoracolumbar spine  Rectal: Deferred  : Deferred  Musculoskeletal: Normal muscle bulk and tone  Skin: Normal color and turgor  Neurologic: A/Ox3, CN II-XII intact, normal gait and station  Psychiatric: Appropriate mood and affect      Impression     63 year old female with a diagnosis of left breast DCIS, ER negative, status post lumpectomy with close margin and reexcision showed no evidence of residual disease.  The patient received postop radiation therapy for her breast cancer with a total dose of 4005 cGy in 15 treatments given from 3/27/2024 - 4/16/2024.     Assessment & Plan:     1.  The patient has been recovering well since radiation therapy with no clinical evidence of cancer recurrence.  She will continue her long-term follow-up and ongoing care with Dr. Dwight Austin, medical oncology and Dr. Prabhu Resendez, PCP as planned.    2.  Follow-up with radiation oncology as needed.    Face to face time  15 minutes with > 80% spent on consultation, education and coordination of care.    Ana Maria MD  Department of Radiation Oncology   [facility]  Tel: [phone]  Page:  [phone]    [facility]  [address] [place] [address]     [facility]   [address] [place] [address]    CC:  Patient Care Team:  Prabhu Resendez MD as PCP - General (Family Medicine)  Maxwell Shepherd as Referring Physician  Dwight Austin MD as Ana Mueller MD as MD (Radiation Oncology)  Ana Maria MD as Assigned Cancer Care Provider      Oncology Rooming Note    May 22, 2024 11:55 AM   Alisa Rosales is a 63 year old female who presents for:    Chief Complaint   Patient presents with     Oncology Clinic Visit     Follow up with Dr. Maria     Initial Vitals: BP (!) 151/79 (BP Location: Right arm, Patient Position: Sitting, Cuff Size: Adult Regular)   Pulse 73   Temp 97.6  F (36.4  C) (Oral)   Resp 16   Wt 77.2 kg (170 lb 4.8 oz)   SpO2 100%  There is no height or weight on file to calculate BMI. There is no height or weight on file to calculate BSA.  No Pain (0) Comment: Data Unavailable   No LMP recorded.  Allergies reviewed: Yes  Medications reviewed: Yes    Medications: Medication refills not needed today.  Pharmacy name entered into [employer]: [facility] #[id number] [address] [facility] [address]    Frailty Screening:   Is the patient here for a new oncology consult visit in cancer care? 2. No      Clinical concerns: Patient here ambulatory for follow-up status post radiation for her breast cancer.  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  Patient denies any new side effects today.  Seen by Dr. Maria.  Plan return to clinic for follow-up as directed by provider.    Dr. Maria was notified.      Becky Clayton RN                Again, thank you for allowing me to participate in the care of your patient.        Sincerely,        Ana Maria MD

## 2025-01-18 ENCOUNTER — HOSPITAL ENCOUNTER (OUTPATIENT)
Dept: MRI IMAGING | Facility: CLINIC | Age: 65
Discharge: HOME | End: 2025-01-18
Attending: PSYCHIATRY & NEUROLOGY | Admitting: PSYCHIATRY & NEUROLOGY
Payer: MEDICARE

## 2025-01-18 PROCEDURE — 72141 MRI NECK SPINE W/O DYE: CPT

## 2025-03-15 ENCOUNTER — HEALTH MAINTENANCE LETTER (OUTPATIENT)
Age: 65
End: 2025-03-15

## 2025-04-26 ENCOUNTER — HEALTH MAINTENANCE LETTER (OUTPATIENT)
Age: 65
End: 2025-04-26